# Patient Record
Sex: FEMALE | Race: WHITE | NOT HISPANIC OR LATINO | Employment: FULL TIME | URBAN - METROPOLITAN AREA
[De-identification: names, ages, dates, MRNs, and addresses within clinical notes are randomized per-mention and may not be internally consistent; named-entity substitution may affect disease eponyms.]

---

## 2020-10-13 ENCOUNTER — APPOINTMENT (OUTPATIENT)
Dept: CT IMAGING | Facility: HOSPITAL | Age: 45
End: 2020-10-13

## 2020-10-13 ENCOUNTER — HOSPITAL ENCOUNTER (EMERGENCY)
Facility: HOSPITAL | Age: 45
Discharge: HOME | End: 2020-10-14
Attending: EMERGENCY MEDICINE | Admitting: EMERGENCY MEDICINE

## 2020-10-13 DIAGNOSIS — K52.9 COLITIS: Primary | ICD-10-CM

## 2020-10-13 LAB
ALBUMIN SERPL-MCNC: 4.9 G/DL (ref 3.5–5.2)
ALBUMIN/GLOB SERPL: 1.6 G/DL
ALP SERPL-CCNC: 95 U/L (ref 39–117)
ALT SERPL W P-5'-P-CCNC: 63 U/L (ref 1–33)
ANION GAP SERPL CALCULATED.3IONS-SCNC: 12 MMOL/L (ref 5–15)
AST SERPL-CCNC: 32 U/L (ref 1–32)
BACTERIA UR QL AUTO: ABNORMAL /HPF
BASOPHILS # BLD AUTO: 0.05 10*3/MM3 (ref 0–0.2)
BASOPHILS NFR BLD AUTO: 0.5 % (ref 0–1.5)
BILIRUB SERPL-MCNC: 0.4 MG/DL (ref 0–1.2)
BILIRUB UR QL STRIP: NEGATIVE
BUN SERPL-MCNC: 12 MG/DL (ref 6–20)
BUN/CREAT SERPL: 15.2 (ref 7–25)
CALCIUM SPEC-SCNC: 10 MG/DL (ref 8.6–10.5)
CHLORIDE SERPL-SCNC: 101 MMOL/L (ref 98–107)
CLARITY UR: ABNORMAL
CO2 SERPL-SCNC: 24 MMOL/L (ref 22–29)
COLOR UR: YELLOW
CREAT SERPL-MCNC: 0.79 MG/DL (ref 0.57–1)
DEPRECATED RDW RBC AUTO: 41.1 FL (ref 37–54)
EOSINOPHIL # BLD AUTO: 0.08 10*3/MM3 (ref 0–0.4)
EOSINOPHIL NFR BLD AUTO: 0.7 % (ref 0.3–6.2)
ERYTHROCYTE [DISTWIDTH] IN BLOOD BY AUTOMATED COUNT: 12.4 % (ref 12.3–15.4)
GFR SERPL CREATININE-BSD FRML MDRD: 78 ML/MIN/1.73
GLOBULIN UR ELPH-MCNC: 3.1 GM/DL
GLUCOSE SERPL-MCNC: 88 MG/DL (ref 65–99)
GLUCOSE UR STRIP-MCNC: NEGATIVE MG/DL
HCT VFR BLD AUTO: 43 % (ref 34–46.6)
HGB BLD-MCNC: 14.3 G/DL (ref 12–15.9)
HGB UR QL STRIP.AUTO: ABNORMAL
HOLD SPECIMEN: NORMAL
HOLD SPECIMEN: NORMAL
HYALINE CASTS UR QL AUTO: ABNORMAL /LPF
IMM GRANULOCYTES # BLD AUTO: 0.03 10*3/MM3 (ref 0–0.05)
IMM GRANULOCYTES NFR BLD AUTO: 0.3 % (ref 0–0.5)
KETONES UR QL STRIP: NEGATIVE
LEUKOCYTE ESTERASE UR QL STRIP.AUTO: ABNORMAL
LIPASE SERPL-CCNC: 55 U/L (ref 13–60)
LYMPHOCYTES # BLD AUTO: 2.4 10*3/MM3 (ref 0.7–3.1)
LYMPHOCYTES NFR BLD AUTO: 21.9 % (ref 19.6–45.3)
MCH RBC QN AUTO: 30.4 PG (ref 26.6–33)
MCHC RBC AUTO-ENTMCNC: 33.3 G/DL (ref 31.5–35.7)
MCV RBC AUTO: 91.3 FL (ref 79–97)
MONOCYTES # BLD AUTO: 0.62 10*3/MM3 (ref 0.1–0.9)
MONOCYTES NFR BLD AUTO: 5.7 % (ref 5–12)
MUCOUS THREADS URNS QL MICRO: ABNORMAL /HPF
NEUTROPHILS NFR BLD AUTO: 7.79 10*3/MM3 (ref 1.7–7)
NEUTROPHILS NFR BLD AUTO: 70.9 % (ref 42.7–76)
NITRITE UR QL STRIP: NEGATIVE
NRBC BLD AUTO-RTO: 0 /100 WBC (ref 0–0.2)
PH UR STRIP.AUTO: <=5 [PH] (ref 5–8)
PLATELET # BLD AUTO: 327 10*3/MM3 (ref 140–450)
PMV BLD AUTO: 9.5 FL (ref 6–12)
POTASSIUM SERPL-SCNC: 4 MMOL/L (ref 3.5–5.2)
PROT SERPL-MCNC: 8 G/DL (ref 6–8.5)
PROT UR QL STRIP: ABNORMAL
RBC # BLD AUTO: 4.71 10*6/MM3 (ref 3.77–5.28)
RBC # UR: ABNORMAL /HPF
REF LAB TEST METHOD: ABNORMAL
SODIUM SERPL-SCNC: 137 MMOL/L (ref 136–145)
SP GR UR STRIP: 1.02 (ref 1–1.03)
SQUAMOUS #/AREA URNS HPF: ABNORMAL /HPF
UROBILINOGEN UR QL STRIP: ABNORMAL
WBC # BLD AUTO: 10.97 10*3/MM3 (ref 3.4–10.8)
WBC UR QL AUTO: ABNORMAL /HPF
WHOLE BLOOD HOLD SPECIMEN: NORMAL
WHOLE BLOOD HOLD SPECIMEN: NORMAL
YEAST URNS QL MICRO: ABNORMAL /HPF

## 2020-10-13 PROCEDURE — 99284 EMERGENCY DEPT VISIT MOD MDM: CPT

## 2020-10-13 PROCEDURE — 81001 URINALYSIS AUTO W/SCOPE: CPT | Performed by: NURSE PRACTITIONER

## 2020-10-13 PROCEDURE — 25010000002 ONDANSETRON PER 1 MG: Performed by: NURSE PRACTITIONER

## 2020-10-13 PROCEDURE — 96375 TX/PRO/DX INJ NEW DRUG ADDON: CPT

## 2020-10-13 PROCEDURE — 74177 CT ABD & PELVIS W/CONTRAST: CPT

## 2020-10-13 PROCEDURE — 25010000002 IOPAMIDOL 61 % SOLUTION: Performed by: EMERGENCY MEDICINE

## 2020-10-13 PROCEDURE — 96374 THER/PROPH/DIAG INJ IV PUSH: CPT

## 2020-10-13 PROCEDURE — 96376 TX/PRO/DX INJ SAME DRUG ADON: CPT

## 2020-10-13 PROCEDURE — 85025 COMPLETE CBC W/AUTO DIFF WBC: CPT | Performed by: NURSE PRACTITIONER

## 2020-10-13 PROCEDURE — 83690 ASSAY OF LIPASE: CPT | Performed by: NURSE PRACTITIONER

## 2020-10-13 PROCEDURE — 25010000002 ONDANSETRON PER 1 MG: Performed by: EMERGENCY MEDICINE

## 2020-10-13 PROCEDURE — 25010000002 MORPHINE PER 10 MG: Performed by: EMERGENCY MEDICINE

## 2020-10-13 PROCEDURE — 80053 COMPREHEN METABOLIC PANEL: CPT | Performed by: NURSE PRACTITIONER

## 2020-10-13 RX ORDER — ONDANSETRON 4 MG/1
4 TABLET, ORALLY DISINTEGRATING ORAL 4 TIMES DAILY PRN
Qty: 16 TABLET | Refills: 0 | Status: SHIPPED | OUTPATIENT
Start: 2020-10-13

## 2020-10-13 RX ORDER — CIPROFLOXACIN 500 MG/1
500 TABLET, FILM COATED ORAL EVERY 12 HOURS
Qty: 20 TABLET | Refills: 0 | Status: SHIPPED | OUTPATIENT
Start: 2020-10-13 | End: 2022-07-28

## 2020-10-13 RX ORDER — LEVOFLOXACIN 750 MG/1
750 TABLET ORAL ONCE
Status: COMPLETED | OUTPATIENT
Start: 2020-10-13 | End: 2020-10-14

## 2020-10-13 RX ORDER — ONDANSETRON 2 MG/ML
4 INJECTION INTRAMUSCULAR; INTRAVENOUS ONCE
Status: COMPLETED | OUTPATIENT
Start: 2020-10-13 | End: 2020-10-13

## 2020-10-13 RX ORDER — MORPHINE SULFATE 4 MG/ML
4 INJECTION, SOLUTION INTRAMUSCULAR; INTRAVENOUS ONCE
Status: COMPLETED | OUTPATIENT
Start: 2020-10-13 | End: 2020-10-13

## 2020-10-13 RX ORDER — SODIUM CHLORIDE 0.9 % (FLUSH) 0.9 %
10 SYRINGE (ML) INJECTION AS NEEDED
Status: DISCONTINUED | OUTPATIENT
Start: 2020-10-13 | End: 2020-10-13

## 2020-10-13 RX ORDER — SODIUM CHLORIDE 0.9 % (FLUSH) 0.9 %
10 SYRINGE (ML) INJECTION AS NEEDED
Status: DISCONTINUED | OUTPATIENT
Start: 2020-10-13 | End: 2020-10-14 | Stop reason: HOSPADM

## 2020-10-13 RX ORDER — METRONIDAZOLE 500 MG/1
500 TABLET ORAL ONCE
Status: COMPLETED | OUTPATIENT
Start: 2020-10-13 | End: 2020-10-14

## 2020-10-13 RX ORDER — HYDROCODONE BITARTRATE AND ACETAMINOPHEN 5; 325 MG/1; MG/1
1 TABLET ORAL EVERY 6 HOURS PRN
Qty: 12 TABLET | Refills: 0 | Status: SHIPPED | OUTPATIENT
Start: 2020-10-13

## 2020-10-13 RX ORDER — METRONIDAZOLE 500 MG/1
500 TABLET ORAL 3 TIMES DAILY
Qty: 30 TABLET | Refills: 0 | Status: SHIPPED | OUTPATIENT
Start: 2020-10-13 | End: 2022-07-28

## 2020-10-13 RX ADMIN — SODIUM CHLORIDE 1000 ML: 9 INJECTION, SOLUTION INTRAVENOUS at 21:58

## 2020-10-13 RX ADMIN — IOPAMIDOL 95 ML: 612 INJECTION, SOLUTION INTRAVENOUS at 23:07

## 2020-10-13 RX ADMIN — ONDANSETRON 4 MG: 2 INJECTION INTRAMUSCULAR; INTRAVENOUS at 21:58

## 2020-10-13 RX ADMIN — ONDANSETRON 4 MG: 2 INJECTION INTRAMUSCULAR; INTRAVENOUS at 23:35

## 2020-10-13 RX ADMIN — MORPHINE SULFATE 4 MG: 4 INJECTION, SOLUTION INTRAMUSCULAR; INTRAVENOUS at 21:58

## 2020-10-13 RX ADMIN — MORPHINE SULFATE 4 MG: 4 INJECTION, SOLUTION INTRAMUSCULAR; INTRAVENOUS at 23:35

## 2020-10-13 RX ADMIN — SODIUM CHLORIDE, PRESERVATIVE FREE 10 ML: 5 INJECTION INTRAVENOUS at 21:40

## 2020-10-14 VITALS
BODY MASS INDEX: 35 KG/M2 | RESPIRATION RATE: 18 BRPM | HEIGHT: 67 IN | WEIGHT: 223 LBS | SYSTOLIC BLOOD PRESSURE: 151 MMHG | DIASTOLIC BLOOD PRESSURE: 93 MMHG | HEART RATE: 97 BPM | OXYGEN SATURATION: 98 % | TEMPERATURE: 98.3 F

## 2020-10-14 PROCEDURE — 25010000002 HYDROMORPHONE PER 4 MG: Performed by: EMERGENCY MEDICINE

## 2020-10-14 PROCEDURE — 96375 TX/PRO/DX INJ NEW DRUG ADDON: CPT

## 2020-10-14 RX ORDER — HYDROMORPHONE HYDROCHLORIDE 1 MG/ML
0.5 INJECTION, SOLUTION INTRAMUSCULAR; INTRAVENOUS; SUBCUTANEOUS ONCE
Status: COMPLETED | OUTPATIENT
Start: 2020-10-14 | End: 2020-10-14

## 2020-10-14 RX ADMIN — METRONIDAZOLE 500 MG: 500 TABLET ORAL at 00:06

## 2020-10-14 RX ADMIN — HYDROMORPHONE HYDROCHLORIDE 0.5 MG: 1 INJECTION, SOLUTION INTRAMUSCULAR; INTRAVENOUS; SUBCUTANEOUS at 00:06

## 2020-10-14 RX ADMIN — LEVOFLOXACIN 750 MG: 750 TABLET, FILM COATED ORAL at 00:06

## 2020-10-14 NOTE — ED PROVIDER NOTES
Subjective   Ms. Katerin Bell is a 45 y.o female presenting to the emergency department with complaints of abdominal pain. Her right lower quadrant pain began approximately 2 weeks ago and she complains of nausea and diaphoresis. She received a hysterectomy at the [facility] 1.5 weeks ago and she has a history of an appendectomy. She denies vomiting, urinary frequency, dysuria, urinary urgency, chest pain, shortness of breath, and fever. There are no other acute symptoms at this time.      History provided by:  Patient  Abdominal Pain  Pain location:  RLQ  Pain radiates to:  Does not radiate  Pain severity:  Moderate  Onset quality:  Sudden  Duration:  2 weeks  Timing:  Constant  Progression:  Worsening  Chronicity:  New  Relieved by:  None tried  Worsened by:  Nothing  Ineffective treatments:  None tried  Associated symptoms: nausea    Associated symptoms: no chest pain, no chills, no dysuria, no fever, no hematemesis, no hematochezia, no hematuria, no shortness of breath and no vomiting    Risk factors: multiple surgeries        Review of Systems   Constitutional: Positive for diaphoresis. Negative for chills and fever.   Respiratory: Negative for shortness of breath.    Cardiovascular: Negative for chest pain.   Gastrointestinal: Positive for abdominal pain and nausea. Negative for hematemesis, hematochezia and vomiting.   Genitourinary: Negative for decreased urine volume, difficulty urinating, dyspareunia, dysuria, enuresis, frequency, hematuria and urgency.   All other systems reviewed and are negative.      No past medical history on file.    Allergies   Allergen Reactions   • Motrin [Ibuprofen] Other (See Comments)     GASTRITIS    • Oxycodone Other (See Comments)     NIGHT TERRORS        No past surgical history on file.    No family history on file.    Social History     Socioeconomic History   • Marital status:      Spouse name: Not on file   • Number of children: Not on file   • Years of  education: Not on file   • Highest education level: Not on file         Objective   Physical Exam  Vitals signs and nursing note reviewed.   Constitutional:       Appearance: Normal appearance. She is well-developed. She is ill-appearing. She is not toxic-appearing.      Comments: Pt appears uncomfortable.     HENT:      Head: Normocephalic and atraumatic.      Mouth/Throat:      Mouth: Mucous membranes are moist.   Eyes:      General: Lids are normal.      Extraocular Movements: Extraocular movements intact.      Conjunctiva/sclera: Conjunctivae normal.   Neck:      Musculoskeletal: Full passive range of motion without pain and normal range of motion.      Trachea: Trachea normal.   Cardiovascular:      Rate and Rhythm: Normal rate and regular rhythm.      Pulses: Normal pulses.      Heart sounds: Normal heart sounds.   Pulmonary:      Effort: Pulmonary effort is normal. No respiratory distress.      Breath sounds: Normal breath sounds. No decreased breath sounds, wheezing, rhonchi or rales.   Abdominal:      General: Bowel sounds are normal.      Palpations: Abdomen is soft.      Tenderness: There is abdominal tenderness in the right lower quadrant.   Musculoskeletal: Normal range of motion.   Skin:     General: Skin is warm and dry.      Findings: No rash.   Neurological:      Mental Status: She is alert and oriented to person, place, and time.      Cranial Nerves: No cranial nerve deficit.   Psychiatric:         Speech: Speech normal.         Behavior: Behavior normal. Behavior is cooperative.         Procedures         ED Course  ED Course as of Oct 14 0020   Tue Oct 13, 2020   2152   COVID-19 RISK SCREEN    Has the patient had close contact without PPE with a lab confirmed COVID-19 (+) person or a person under investigation (PUI) for COVID-19 infection?  -- No     Has the patient had respiratory symptoms, worsened/new cough and/or SOA, unexplained fever, or sudden loss of smell and/or taste in the past 7 days?  --  No    Does the patient have baseline higher exposure risk such as working in healthcare field or currently residing in healthcare facility?  --  No          [HV]   2203 WBC, UA(!): 13-20 [KG]   2203 Leukocytes, UA(!): Trace [KG]   2203 Blood, UA(!): Small (1+) [KG]   2348 Reuben Reviewed. Request Number: [id number]     [HV]   Wed Oct 14, 2020   0014 Results are discussed with patient at this time.  Patient will be discharged home.  Patient to take meds as ordered.  Patient to follow-up with PCP.  Patient to return to the ED as needed.  Patient agrees and verbalized understanding.    [KG]      ED Course User Index  [HV] Rachel Davison  [KG] Delia Conde APRN     Recent Results (from the past 24 hour(s))   Urinalysis With Microscopic If Indicated (No Culture) - Urine, Clean Catch    Collection Time: 10/13/20  9:11 PM    Specimen: Urine, Clean Catch   Result Value Ref Range    Color, UA Yellow Yellow, Straw    Appearance, UA Cloudy (A) Clear    pH, UA <=5.0 5.0 - 8.0    Specific Gravity, UA 1.023 1.001 - 1.030    Glucose, UA Negative Negative    Ketones, UA Negative Negative    Bilirubin, UA Negative Negative    Blood, UA Small (1+) (A) Negative    Protein, UA Trace (A) Negative    Leuk Esterase, UA Trace (A) Negative    Nitrite, UA Negative Negative    Urobilinogen, UA 0.2 E.U./dL 0.2 - 1.0 E.U./dL   Urinalysis, Microscopic Only - Urine, Clean Catch    Collection Time: 10/13/20  9:11 PM    Specimen: Urine, Clean Catch   Result Value Ref Range    RBC, UA 7-12 (A) None Seen, 0-2 /HPF    WBC, UA 13-20 (A) None Seen, 0-2 /HPF    Bacteria, UA None Seen None Seen, Trace /HPF    Squamous Epithelial Cells, UA 21-30 (A) None Seen, 0-2 /HPF    Yeast, UA Small/1+ Budding Yeast None Seen /HPF    Hyaline Casts, UA 7-12 0 - 6 /LPF    Mucus, UA Moderate/2+ (A) None Seen, Trace /HPF    Methodology Manual Light Microscopy    Comprehensive Metabolic Panel    Collection Time: 10/13/20  9:40 PM    Specimen: Blood   Result  Value Ref Range    Glucose 88 65 - 99 mg/dL    BUN 12 6 - 20 mg/dL    Creatinine 0.79 0.57 - 1.00 mg/dL    Sodium 137 136 - 145 mmol/L    Potassium 4.0 3.5 - 5.2 mmol/L    Chloride 101 98 - 107 mmol/L    CO2 24.0 22.0 - 29.0 mmol/L    Calcium 10.0 8.6 - 10.5 mg/dL    Total Protein 8.0 6.0 - 8.5 g/dL    Albumin 4.90 3.50 - 5.20 g/dL    ALT (SGPT) 63 (H) 1 - 33 U/L    AST (SGOT) 32 1 - 32 U/L    Alkaline Phosphatase 95 39 - 117 U/L    Total Bilirubin 0.4 0.0 - 1.2 mg/dL    eGFR Non African Amer 78 >60 mL/min/1.73    Globulin 3.1 gm/dL    A/G Ratio 1.6 g/dL    BUN/Creatinine Ratio 15.2 7.0 - 25.0    Anion Gap 12.0 5.0 - 15.0 mmol/L   Lipase    Collection Time: 10/13/20  9:40 PM    Specimen: Blood   Result Value Ref Range    Lipase 55 13 - 60 U/L   CBC Auto Differential    Collection Time: 10/13/20  9:40 PM    Specimen: Blood   Result Value Ref Range    WBC 10.97 (H) 3.40 - 10.80 10*3/mm3    RBC 4.71 3.77 - 5.28 10*6/mm3    Hemoglobin 14.3 12.0 - 15.9 g/dL    Hematocrit 43.0 34.0 - 46.6 %    MCV 91.3 79.0 - 97.0 fL    MCH 30.4 26.6 - 33.0 pg    MCHC 33.3 31.5 - 35.7 g/dL    RDW 12.4 12.3 - 15.4 %    RDW-SD 41.1 37.0 - 54.0 fl    MPV 9.5 6.0 - 12.0 fL    Platelets 327 140 - 450 10*3/mm3    Neutrophil % 70.9 42.7 - 76.0 %    Lymphocyte % 21.9 19.6 - 45.3 %    Monocyte % 5.7 5.0 - 12.0 %    Eosinophil % 0.7 0.3 - 6.2 %    Basophil % 0.5 0.0 - 1.5 %    Immature Grans % 0.3 0.0 - 0.5 %    Neutrophils, Absolute 7.79 (H) 1.70 - 7.00 10*3/mm3    Lymphocytes, Absolute 2.40 0.70 - 3.10 10*3/mm3    Monocytes, Absolute 0.62 0.10 - 0.90 10*3/mm3    Eosinophils, Absolute 0.08 0.00 - 0.40 10*3/mm3    Basophils, Absolute 0.05 0.00 - 0.20 10*3/mm3    Immature Grans, Absolute 0.03 0.00 - 0.05 10*3/mm3    nRBC 0.0 0.0 - 0.2 /100 WBC   Light Blue Top    Collection Time: 10/13/20  9:40 PM   Result Value Ref Range    Extra Tube hold for add-on    Green Top (Gel)    Collection Time: 10/13/20  9:40 PM   Result Value Ref Range    Extra Tube  Hold for add-ons.    Lavender Top    Collection Time: 10/13/20  9:40 PM   Result Value Ref Range    Extra Tube hold for add-on    Gold Top - SST    Collection Time: 10/13/20  9:40 PM   Result Value Ref Range    Extra Tube Hold for add-ons.      Note: In addition to lab results from this visit, the labs listed above may include labs taken at another facility or during a different encounter within the last 24 hours. Please correlate lab times with ED admission and discharge times for further clarification of the services performed during this visit.    CT Abdomen Pelvis With Contrast   Final Result   1.  Mild wall thickening involving the rectosigmoid colon may represent active colitis. There is no diverticulosis. No complicating feature. The remainder of the colon is normal.   2.  Postop recent hysterectomy with a tiny amount of free pelvic fluid. No hematoma or abscess.   3.  Cholecystectomy. Mild extrahepatic bile duct dilatation.      Signer Name: Paolo Orozco MD    Signed: 10/13/2020 11:31 PM    [email or web address]     [facility]        Vitals:    10/13/20 2202 10/13/20 2258 10/14/20 0006 10/14/20 0007   BP: (!) 143/103  151/93    BP Location:       Patient Position:       Pulse: 97      Resp: 18      Temp:       TempSrc:       SpO2: 99% 99% 97% 98%   Weight:       Height:         Medications   sodium chloride 0.9 % flush 10 mL (has no administration in time range)   sodium chloride 0.9 % flush 10 mL (has no administration in time range)   sodium chloride 0.9 % flush 10 mL (10 mL Intravenous Given by Other 10/13/20 2140)   sodium chloride 0.9 % bolus 1,000 mL (0 mL Intravenous Stopped 10/14/20 0009)   Morphine sulfate (PF) injection 4 mg (4 mg Intravenous Given 10/13/20 2158)   ondansetron (ZOFRAN) injection 4 mg (4 mg Intravenous Given 10/13/20 2158)   iopamidol (ISOVUE-300) 61 % injection 100 mL (95 mL Intravenous Given 10/13/20 2307)   Morphine sulfate (PF) injection  4 mg (4 mg Intravenous Given 10/13/20 2335)   ondansetron (ZOFRAN) injection 4 mg (4 mg Intravenous Given 10/13/20 2335)   levoFLOXacin (LEVAQUIN) tablet 750 mg (750 mg Oral Given 10/14/20 0006)   metroNIDAZOLE (FLAGYL) tablet 500 mg (500 mg Oral Given 10/14/20 0006)   HYDROmorphone (DILAUDID) injection 0.5 mg (0.5 mg Intravenous Given 10/14/20 0006)     ECG/EMG Results (last 24 hours)     ** No results found for the last 24 hours. **        No orders to display                                              MDM    Final diagnoses:   Colitis       Documentation assistance provided by vito Davison.  Information recorded by the vtio was done at my direction and has been verified and validated by me.     Rachel Davison  10/13/20 2151       Delia Conde, APRN  10/14/20 0021

## 2022-06-16 ENCOUNTER — TELEPHONE (OUTPATIENT)
Dept: NEUROSURGERY | Facility: CLINIC | Age: 47
End: 2022-06-16

## 2022-07-21 RX ORDER — HYDROCODONE BITARTRATE AND ACETAMINOPHEN 5; 325 MG/1; MG/1
1 TABLET ORAL
COMMUNITY
Start: 2022-06-15 | End: 2022-07-28

## 2022-07-21 RX ORDER — NARATRIPTAN 2.5 MG/1
2.5 TABLET ORAL
COMMUNITY
Start: 2022-02-24

## 2022-07-28 ENCOUNTER — DOCUMENTATION (OUTPATIENT)
Dept: BARIATRICS/WEIGHT MGMT | Facility: CLINIC | Age: 47
End: 2022-07-28

## 2022-07-28 ENCOUNTER — OFFICE VISIT (OUTPATIENT)
Dept: BEHAVIORAL HEALTH | Facility: CLINIC | Age: 47
End: 2022-07-28

## 2022-07-28 ENCOUNTER — OFFICE VISIT (OUTPATIENT)
Dept: BARIATRICS/WEIGHT MGMT | Facility: CLINIC | Age: 47
End: 2022-07-28

## 2022-07-28 VITALS
RESPIRATION RATE: 16 BRPM | OXYGEN SATURATION: 99 % | DIASTOLIC BLOOD PRESSURE: 78 MMHG | WEIGHT: 238 LBS | HEART RATE: 78 BPM | BODY MASS INDEX: 37.35 KG/M2 | HEIGHT: 67 IN | SYSTOLIC BLOOD PRESSURE: 122 MMHG

## 2022-07-28 DIAGNOSIS — E66.01 MORBID OBESITY: Primary | ICD-10-CM

## 2022-07-28 DIAGNOSIS — F41.0 PANIC DISORDER: ICD-10-CM

## 2022-07-28 DIAGNOSIS — Z71.89 ENCOUNTER FOR PSYCHOLOGICAL ASSESSMENT PRIOR TO BARIATRIC SURGERY: ICD-10-CM

## 2022-07-28 DIAGNOSIS — R10.13 DYSPEPSIA: ICD-10-CM

## 2022-07-28 DIAGNOSIS — R11.0 CHRONIC NAUSEA: ICD-10-CM

## 2022-07-28 DIAGNOSIS — R53.83 FATIGUE, UNSPECIFIED TYPE: ICD-10-CM

## 2022-07-28 DIAGNOSIS — E66.9 OBESITY (BMI 30-39.9): Primary | ICD-10-CM

## 2022-07-28 DIAGNOSIS — R53.81 GENERAL DETERIORATION OF HEALTH: ICD-10-CM

## 2022-07-28 DIAGNOSIS — R73.03 PREDIABETES: ICD-10-CM

## 2022-07-28 PROBLEM — Z98.890 POSTOPERATIVE NAUSEA: Status: ACTIVE | Noted: 2022-07-28

## 2022-07-28 PROBLEM — G47.30 SLEEP APNEA: Status: ACTIVE | Noted: 2022-07-28

## 2022-07-28 PROBLEM — F41.9 ANXIETY: Status: ACTIVE | Noted: 2022-07-28

## 2022-07-28 PROBLEM — N20.0 KIDNEY STONES: Status: ACTIVE | Noted: 2022-07-28

## 2022-07-28 PROBLEM — K58.9 IBS (IRRITABLE BOWEL SYNDROME): Status: ACTIVE | Noted: 2022-07-28

## 2022-07-28 PROBLEM — G47.00 INSOMNIA: Status: ACTIVE | Noted: 2022-07-28

## 2022-07-28 PROBLEM — K75.81 NASH (NONALCOHOLIC STEATOHEPATITIS): Status: ACTIVE | Noted: 2022-07-28

## 2022-07-28 PROBLEM — E78.5 HYPERLIPIDEMIA: Status: ACTIVE | Noted: 2022-07-28

## 2022-07-28 PROBLEM — S06.9XAA TBI (TRAUMATIC BRAIN INJURY) (HCC): Status: ACTIVE | Noted: 2022-07-28

## 2022-07-28 PROBLEM — M25.50 JOINT PAIN: Status: ACTIVE | Noted: 2022-07-28

## 2022-07-28 PROBLEM — M19.90 ARTHRITIS: Status: ACTIVE | Noted: 2022-07-28

## 2022-07-28 PROBLEM — R06.09 DYSPNEA ON EXERTION: Status: ACTIVE | Noted: 2022-07-28

## 2022-07-28 PROBLEM — G43.909 MIGRAINES: Status: ACTIVE | Noted: 2022-07-28

## 2022-07-28 PROBLEM — R12 HEARTBURN: Status: ACTIVE | Noted: 2022-07-28

## 2022-07-28 PROBLEM — E21.3 HYPERPARATHYROIDISM (HCC): Status: ACTIVE | Noted: 2022-07-28

## 2022-07-28 PROCEDURE — 90791 PSYCH DIAGNOSTIC EVALUATION: CPT | Performed by: PSYCHOLOGIST

## 2022-07-28 PROCEDURE — 99204 OFFICE O/P NEW MOD 45 MIN: CPT | Performed by: PHYSICIAN ASSISTANT

## 2022-07-28 RX ORDER — CLONIDINE HYDROCHLORIDE 0.1 MG/1
TABLET ORAL
COMMUNITY
Start: 2022-07-27

## 2022-07-28 RX ORDER — LORAZEPAM 1 MG/1
TABLET ORAL
COMMUNITY

## 2022-07-28 RX ORDER — VENLAFAXINE HYDROCHLORIDE 150 MG/1
CAPSULE, EXTENDED RELEASE ORAL
COMMUNITY

## 2022-07-28 NOTE — PROGRESS NOTES
"Weight Loss Surgery  Presurgical Nutrition Assessment     Katerin Thakkarzfus  07/28/2022  [id number]  [id number]  1975   female    Surgery desired: Sleeve Gastrectomy    Height: 168.9 cm (66.5\")  Weight: 108 kg (238 #)  BMI: 37.84    Past Medical History:   Diagnosis Date   • Allergic rhinitis     frequent ear aches and sinus infections   • Anxiety     w/ panic disorder, on Effexor + Lorazepam   • Arthritis     mostly toes, from ballet dancing   • Chronic nausea     prn Zofran   • Dyspepsia    • Dyspnea on exertion    • Fatigue    • Heartburn     no meds   • Hyperlipidemia    • Hyperparathyroidism (HCC)     s/p partial parathyroidectomy 2020   • Insomnia     on Clonidine   • Joint pain     prn NSAIDS, no steroids   • Kidney stones     s/p lithotripsy 2019   • Migraines    • Morbid obesity (HCC)    • CASTILLO (nonalcoholic steatohepatitis)     dx on CT, denies prior liver bx   • Postoperative nausea    • Prediabetes     A1C 5.7   • TBI (traumatic brain injury) (HCC)     age 12     Past Surgical History:   Procedure Laterality Date   • APPENDECTOMY OPEN  1995   • COLONOSCOPY  2021    hx colitis   • ENDOSCOPY  2021    @   • KIDNEY STONE SURGERY  2019   • LAPAROSCOPIC CHOLECYSTECTOMY  2013    (+) stones   • LAPAROSCOPIC OVARIAN CYSTECTOMY  2018   • LAPAROSCOPIC TOTAL HYSTERECTOMY  2020    @[facility]   • PARATHYROIDECTOMY  2020    3 of 4 glands   • PILONIDAL CYSTECTOMY  2013    x6 (2013, 2014, 2015, 2017, 2018)   • UMBILICAL HERNIA REPAIR  2013    w/ [facility]     Allergies   Allergen Reactions   • Baclofen Nausea And Vomiting and Rash   • Contrast Dye Other (See Comments)     migraines    • Cyclobenzaprine Other (See Comments)     Brand name: Flexeril - night terrors     • Methocarbamol Other (See Comments)     NIGHT TERRORS     • Motrin [Ibuprofen] Other (See Comments)     GASTRITIS    • Oxycodone Other (See Comments)     NIGHT TERRORS        Current Outpatient Medications:   •  cloNIDine (CATAPRES) 0.1 MG " tablet, , Disp: , Rfl:   •  Erenumab-aooe (AIMOVIG) 140 MG/ML prefilled syringe, Inject 140 mg under the skin into the appropriate area as directed Every 30 (Thirty) Days., Disp: , Rfl:   •  HYDROcodone-acetaminophen (NORCO) 5-325 MG per tablet, Take 1 tablet by mouth Every 6 (Six) Hours As Needed for Moderate Pain ., Disp: 12 tablet, Rfl: 0  •  LORazepam (ATIVAN) 1 MG tablet, lorazepam, Disp: , Rfl:   •  naratriptan (AMERGE) 2.5 MG tablet, Take 2.5 mg by mouth., Disp: , Rfl:   •  ondansetron ODT (ZOFRAN-ODT) 4 MG disintegrating tablet, Place 1 tablet on the tongue 4 (Four) Times a Day As Needed for Nausea or Vomiting., Disp: 16 tablet, Rfl: 0  •  venlafaxine XR (EFFEXOR-XR) 150 MG 24 hr capsule, Effexor XR, Disp: , Rfl:       Assessment of Caloric needs    Estimated Current energy needs:  1730 kcal    Estimated calories for weight loss:  1600 kcal    IBW (Pounds):  155 #      Excess body weight (Pounds):  85 #       Nutrition Recall:  Example of Usual 24 hour intake:      Breakfast: 10 oz coffee with sugar free creamer and 5 oz bottled water.  No food.     Lunch: 20 oz bottle of diet Pepsi and 2 cups diced cantaloupe    Dinner: 2 pieces of flatbread pizza withtomatoes, pesto and [place] estrada    Snacks: at midnight ate a snack bag of mini chocolate chip cookes, 2 packages PB filled crackers, 20 oz bottle Root Beer.     Beverages of Choice: daily diet Pepsi (20 oz),  Root Beer (20 oz), 10 oz coffee, as above.  Loves brewed tea with no sugar. Coffee /c creamer each morning.     Food Allergies or Intolerances: none stated          Exercise: none currently       Assessment of Nutritional Adequacy, Excessive Intake or Deficiencies:   Diet is deficient in protein and total calories with insufficient fluids.  Lacks a variety of fruits and vegetables.         Education    Provided information packet re:  Sleeve Gastrectomy  1. Reviewed guidelines for higher protein, limited carbohydrate diet to promote weight loss.   Encouraged patient to incorporate these principles of healthy eating    2. Encouraged patient to choose an acceptable protein supplement beverage for the post-surgery liquid diet.  Provided product guidelines and examples.    3. Explained importance of goal setting to help in changing eating behaviors that are not conducive to weight loss.  Specific macronutrient goals as below.   4. Provided follow-up options for support, including contact information for dietitians here.   Web-based support information and apps for smart phones and computers given.      Recommend that team proceed with surgery and follow per protocol.      Nutrition Goals   Protein goal:  grams per day in three regular balanced meals and two to three high protein snacks each day.  Carbohydrate goal:  100-140 grams per day  Beverage goal: Appropriate non-carbonated beverage intake     Exercise Goals  Patient is a former long-distance runner and states she still sees herself as such.  In the past, after races, states her appetite was depressed and that she continues to go long periods without food.   She will incorporate moderate activity as tolerated, [id number] minutes of activity per day as tolerated and per medical advice as she works toward a greater fitness level.    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       Carloyne Lilly, ROGERS  07/28/2022  10:55 EDT

## 2022-07-28 NOTE — PROGRESS NOTES
"[facility] BARIATRIC SURGERY  [address]  [address]  [facility] [id number] [phone]      Patient  Name:  Katerin Bell  :  1975      Date of Visit: 2022      Chief Complaint:  weight gain; unable to maintain weight loss      History of Present Illness:  Katerin Bell is a 47 y.o. female who presents today for evaluation, education and consultation regarding metabolic and bariatric surgery with Dr. Hutchins.     .  Lives in [place].  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     Says had always been underweight, most of her life - was a runner, ballet dancer, healthy eater.      When she turned 38 she was finally started on a medication regimen (Effexor + Lorazepam) that drastically helped w/ her chronic panic disorder.  She gained 25 lbs and was finally a more normal weight.     But then required 6 pilonidal cyst procedures over the next few years which forced her to stop running.    Shortly thereafter began having abdominal/GYN issues requiring several additional surgeries - lap ken + UHR  (prolonged/difficult recovery), lap ovarian cystectomy , lap total hysterectomy .    Now more recently undergoing eval for possible  shunt w/ ? 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     Following w/ Neurology @ and the Chief of Neurology @ [facility] who encouraged her to pursue bariatric surgery as a possible therapeutic intervention.      She very much wants to get/feel healthy again.  Feels weight gain is r/t medical issues, psychological trauma, and age.    Also notes hx chronic RLQ pain x 3 years, radiating into back/sacrum, unclear etiology (? ilioinguinal neuralgia) - evaluated @ [facility], [facility], and .  " Suspect may be nerve damage, MSK, trauma related.  Manages pain w/ daily Norco.      Has associated nausea, chronic, upon awakening.  Does not eat breakfast.  Cannot tolerate food until late afternoon.  Eats most meals late evening.  Occ vomiting.  Takes Zofran prn.      Had GI evals @[facility], [facility], and 2nd opinion in [place].  Has not had GES.      EGD 2021 @[facility] - dx w/ GERD but has infrequent heartburn, does not require medication.      ----  Katerin has been overweight for at least 7 years, has been 35 pounds or more overweight for at least 6 years, has been 100 pounds or more overweight for 4 or more years.  Previous diet attempts include: Low Carbohydrate, Low Fat, Calorie Counting, Karyna's Diet, [place] and Fasting.  The most weight Katerin lost was 15 pounds w/ Atkins but was unable to maintain that weight loss.  Her maximum lifetime weight is 240 pounds.       Complete history has been obtained and discussed today, as pertinent to metabolic/ bariatric surgery.     Past Medical History:   Diagnosis Date   • Allergic rhinitis     frequent ear aches and sinus infections   • Anxiety     w/ panic disorder, on Effexor + Lorazepam   • Arthritis     mostly toes, from ballet dancing   • Chronic nausea     prn Zofran   • Dyspepsia    • Dyspnea on exertion    • Fatigue    • Heartburn     no meds   • Hyperlipidemia    • Hyperparathyroidism (HCC)     s/p partial parathyroidectomy 2020   • Insomnia     on Clonidine   • Joint pain     prn NSAIDS, no steroids   • Kidney stones     s/p lithotripsy 2019   • Migraines    • Morbid obesity (HCC)    • CASTILLO (nonalcoholic steatohepatitis)     dx on CT, denies prior liver bx   • Postoperative nausea    • Prediabetes     A1C 5.7   • TBI (traumatic brain injury) (HCC)     age 12     Past Surgical History:   Procedure Laterality Date   • APPENDECTOMY OPEN  1995   • COLONOSCOPY  2021    hx colitis   • ENDOSCOPY  2021    @[facility]   • KIDNEY STONE SURGERY  2019   • LAPAROSCOPIC CHOLECYSTECTOMY   2013    (+) stones   • LAPAROSCOPIC OVARIAN CYSTECTOMY  2018   • LAPAROSCOPIC TOTAL HYSTERECTOMY  2020    @[facility]   • PARATHYROIDECTOMY  2020    3 of 4 glands   • PILONIDAL CYSTECTOMY  2013    x6 (2013, 2014, 2015, 2017, 2018)   • UMBILICAL HERNIA REPAIR  2013    w/ [facility] ken [facility]       Allergies   Allergen Reactions   • Baclofen Nausea And Vomiting and Rash   • Contrast Dye Other (See Comments)     migraines    • Cyclobenzaprine Other (See Comments)     Brand name: Flexeril - night terrors     • Methocarbamol Other (See Comments)     NIGHT TERRORS     • Motrin [Ibuprofen] Other (See Comments)     GASTRITIS    • Oxycodone Other (See Comments)     NIGHT TERRORS        Current Outpatient Medications:   •  cloNIDine (CATAPRES) 0.1 MG tablet, , Disp: , Rfl:   •  Erenumab-aooe (AIMOVIG) 140 MG/ML prefilled syringe, Inject 140 mg under the skin into the appropriate area as directed Every 30 (Thirty) Days., Disp: , Rfl:   •  HYDROcodone-acetaminophen (NORCO) 5-325 MG per tablet, Take 1 tablet by mouth Every 6 (Six) Hours As Needed for Moderate Pain ., Disp: 12 tablet, Rfl: 0  •  LORazepam (ATIVAN) 1 MG tablet, lorazepam, Disp: , Rfl:   •  naratriptan (AMERGE) 2.5 MG tablet, Take 2.5 mg by mouth., Disp: , Rfl:   •  ondansetron ODT (ZOFRAN-ODT) 4 MG disintegrating tablet, Place 1 tablet on the tongue 4 (Four) Times a Day As Needed for Nausea or Vomiting., Disp: 16 tablet, Rfl: 0  •  venlafaxine XR (EFFEXOR-XR) 150 MG 24 hr capsule, Effexor XR, Disp: , Rfl:     Social History     Socioeconomic History   • Marital status:    Tobacco Use   • Smoking status: Never Smoker   • Smokeless tobacco: Never Used   Substance and Sexual Activity   • Alcohol use: Never   • Drug use: Never   • Sexual activity: Defer     Social History     Social History Narrative    Lives in [facility].   with no children.  Instructor @ [employer].        Family History   Problem Relation Age of  Onset   • Breast cancer Mother 49   • Obesity Father    • Hypertension Father    • Heart attack Father 59   • Heart disease Father 59   • Sleep apnea Father 65   • Prostate cancer Father 68   • No Known Problems Sister    • No Known Problems Brother    • Stroke Maternal Grandmother 82   • Skin cancer Maternal Grandmother    • Kidney disease Maternal Grandmother    • Mental illness Maternal Grandmother    • Skin cancer Maternal Grandfather    • Alcohol abuse Maternal Grandfather    • Heart attack Paternal Grandmother 80   • Heart disease Paternal Grandmother 75   • Alcohol abuse Paternal Grandfather        Review of Systems:  Constitutional:  reports fatigue, weight gain and denies fevers, chills.  HEENT:  denies headache, ear pain or loss of hearing, double vision, nasal discharge or sore throat.  Cardiovascular:  reports HLD and denies hx heart disease, hx MI, chest pain, palpitations, hx DVT.  Respiratory:  denies cough , wheezing, sleep apnea, asthma, hx PE.  Gastrointestinal:  reports nausea, vomiting, IBS, liver disease and denies dysphagia, heartburn, hx pancreatitis.  Genitourinary:  denies history of  frequent UTI, incontinence, hematuria, dysuria, polyuria, polydipsia, renal insufficiency.    Musculoskeletal:  reports joint pain, arthritis and denies fibromyalgia and autoimmune disease.  Neurological:  reports migraines and denies seizure, stroke.  Psychiatric:  reports hx anxiety w/ panic d/o and denies depressed mood, bipolar disorder.  Endocrine:  reports glucose intolerance and denies diabetes, thyroid disease.  Hematologic:  denies bruising, bleeding disorder, hx anemia, hx blood transfusion.      Physical Exam:  Vital Signs:  Weight: 108 kg (238 lb)   Body mass index is 37.84 kg/m².      Heart Rate: 78   BP: 122/78     Physical Exam  Vitals reviewed.   Constitutional:       Appearance: She is well-developed.      Comments: wearing a mask   HENT:      Head: Normocephalic and atraumatic.   Eyes:       General: No scleral icterus.     Conjunctiva/sclera: Conjunctivae normal.   Neck:      Thyroid: No thyromegaly.   Cardiovascular:      Rate and Rhythm: Normal rate and regular rhythm.      Heart sounds: No murmur heard.  Pulmonary:      Effort: Pulmonary effort is normal. No respiratory distress.      Breath sounds: Normal breath sounds. No wheezing or rales.   Abdominal:      General: Bowel sounds are normal. There is no distension.      Palpations: Abdomen is soft. There is no mass.      Tenderness: There is no abdominal tenderness.      Hernia: No hernia is present.      Comments: scars: lap ken, umbilical, lap hysterectomy, open appy   Musculoskeletal:         General: Normal range of motion.      Cervical back: Neck supple.   Skin:     General: Skin is warm and dry.      Findings: No rash.   Neurological:      Mental Status: She is alert and oriented to person, place, and time.      Gait: Gait normal.   Psychiatric:         Judgment: Judgment normal.         Patient Active Problem List   Diagnosis   • Hyperlipidemia   • Morbid obesity (HCC)   • Fatigue   • Dyspepsia   • Dyspnea on exertion   • TBI (traumatic brain injury) (HCC)   • Postoperative nausea   • Anxiety   • Insomnia   • Migraines   • Chronic nausea   • Joint pain   • Kidney stones   • Prediabetes   • IBS (irritable bowel syndrome)   • CASTILLO (nonalcoholic steatohepatitis)   • Hyperparathyroidism (HCC)   • Arthritis   • Heartburn       Assessment:  47 y.o. female with medically complicated obesity pursuing sleeve gastrectomy.    Metabolic & Bariatric Surgery is deemed medically necessary given the following: Class 2 Severe Obesity (BMI >=35 and <=39.9). Obesity-related health conditions include the following: hydrocephalus, fatty liver, prediabetes, migraines, hyperlipidemia, joint pain, chronic abd.pain, nausea and fatigue. Obesity is worsening. BMI is is above average; BMI management plan is completed. We discussed consulting a Bariatric  surgeon.        Plan:  Further evaluation will include: CBC, CMP, Lipids, TSH, HgA1C, H.Pylori UBT, EKG, CXR, Gastric Emptying Study and EGD (requested for review).    Additional clearances needed prior to surgery will include: Neurology.     Patient understands that bariatric surgery is not cosmetic surgery but rather a tool to help make a lifelong commitment to lifestyle changes including diet, exercise and behavior modifications.  The patient has been educated today on those expected postoperative lifestyle changes.  Psychological and Nutritional consultations will be completed prior to surgery.  Instructions on how to access [employer] (an internet based site w/ educational surgical videos) were given to the patient.  Recommended perioperative vitamin supplementation was reviewed.  The importance of avoiding ASA/ NSAIDS/ steroids/ tobacco/nicotine/ hormones/ immunomodulators perioperatively was discussed in detail.  All questions/concerns have been addressed.      Further input to follow pending the above.           MARI Alonso

## 2022-07-28 NOTE — PROGRESS NOTES
PROGRESS NOTE    Data:    Katerin Bell is a 47 y.o. female who met with the undersigned for a scheduled psychological evaluation from 9:00 - 9:45am.      Clinical Maneuvering/Intervention:      Chief complaint and history of presenting illness/Problems: struggling with obesity for several years. Despite trying different weight loss plans and diets, the pt reported being unsuccessful in losing weight. A psychological evaluation was conducted in order to assess past and current level of functioning. Areas assessed included, but were not limited to: perception of social support, perception of ability to face and deal with challenges in life (positive functioning), anxiety symptoms, depressive symptoms, perspective on beliefs/belief system, coping skills for stress, intelligence level, addiction issues, etc. Therapeutic rapport was established. Interventions conducted today were geared towards assessing the pt's readiness for weight loss surgery and identifying and psychological contraindications for undergoing such a major life change. 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 Current psychological struggles were described as low but included panic disorder (symptoms currently well-managed with medication). She has suffered numerous health problems over the past 9 years (prediabetic, migraines, hydrocephalus, etc.). She worries that health with continue to rapidly decline and/or not improve without help with losing weight. Coping skills for distress and related to undergoing a major life change such as weight loss surgery/weight loss were deemed strong and included strong katheryn in God, intelligent, relies on her strong social support system, follows directions well, responsible person, maintains quality relationships with others, and believes in herself that  she will be successful with weight loss surgery. The pt endorsed having characteristics of readiness to undergo major life changes inherent in the journey of weight loss surgery. She could speak to having suffered enough with failed diets and that she feels quite confident that weight loss surgery is right for her. She was able to describe as well, why it made sense to her that having this surgery at this point in life makes sense. Life is feeling centered, stable, etc. The pt expressed gratitude for today's visit.     Past Family and Social History:      History of family mental health problems: none endorsed    Psychosocial history: treatment of psychiatric care in the past (N/A), alcohol/substance abuse treatment in the past (N/A) , alcohol/substance abuse problems (N/A), inpatient psychiatric care (N/A).    Mental Status Exam (MSE):  Hygiene:  good  Dress: normal  Attitude:  cooperative and proactive  Motor Activity: normal  Speech: normal  Mood:   hopeful  Affect:  congruent  Thought Processes: normal  Thought Content:  normal  Suicidal Thoughts:  not endorsed  Homicidal Thoughts:  not endorsed  Crisis Safety Plan: not needed   Hallucinations:  none      Patient's Support Network Includes:  family, friends      Progress toward goal: there is evidence to suggest that she is taking measures to improve the quality of her life including seeking weight loss surgery.       Functional Status: moderate to high      Prognosis: good with weight loss surgery    Evaluation, Diagnoses, and Ability/Capacity to Respond to Treatment:      The pt presented to be struggling with panic disorder and obesity (BMI = 37.84, obesity). Results of MSE demonstrated a functional status of moderate to high. Strengths: belief in self that she will be successful with weight loss surgery, etc (see detailed list of coping skills above). Needed for growth (CPT code requirement for Weaknesses): weight loss.      From a psychological standpoint,  the pt presents as a good candidate for bariatric surgery. She is motivated for the surgery, has showed readiness for the lifestyle change in terms of starting to adjust her eating habits, and seems to have appropriate expectations of how to prepare and how to live after surgery in order to lose weight successfully.    Treatment Plan:      Short term goals: Start improving her health by following up with her bariatric surgeon in order to receive weight loss surgery as soon as feasible/appropriate and demonstrate success with compliance to adhering to the recommended diet. Long term goals: reach a healthy weight and prevention of relapse of panic disorder via taking control over her health.    Deborah Unger, PhD, LP

## 2022-08-01 LAB
TSH SERPL DL<=0.005 MIU/L-ACNC: 6.24 UIU/ML (ref 0.45–4.5)
UREA BREATH TEST QL: NEGATIVE

## 2024-08-23 ENCOUNTER — APPOINTMENT (OUTPATIENT)
Facility: HOSPITAL | Age: 49
End: 2024-08-23
Payer: COMMERCIAL

## 2024-08-23 ENCOUNTER — HOSPITAL ENCOUNTER (EMERGENCY)
Facility: HOSPITAL | Age: 49
Discharge: HOME | End: 2024-08-23
Attending: EMERGENCY MEDICINE
Payer: COMMERCIAL

## 2024-08-23 VITALS
RESPIRATION RATE: 18 BRPM | HEIGHT: 67 IN | BODY MASS INDEX: 39.08 KG/M2 | SYSTOLIC BLOOD PRESSURE: 123 MMHG | HEART RATE: 74 BPM | WEIGHT: 249 LBS | DIASTOLIC BLOOD PRESSURE: 77 MMHG | OXYGEN SATURATION: 96 % | TEMPERATURE: 98 F

## 2024-08-23 DIAGNOSIS — H66.92 ACUTE OTITIS MEDIA, LEFT: Primary | ICD-10-CM

## 2024-08-23 PROCEDURE — 99284 EMERGENCY DEPT VISIT MOD MDM: CPT

## 2024-08-23 PROCEDURE — 70450 CT HEAD/BRAIN W/O DYE: CPT

## 2024-08-23 RX ORDER — HYDROCODONE BITARTRATE AND ACETAMINOPHEN 5; 325 MG/1; MG/1
1 TABLET ORAL ONCE
Status: COMPLETED | OUTPATIENT
Start: 2024-08-23 | End: 2024-08-23

## 2024-08-23 RX ORDER — CEFDINIR 300 MG/1
300 CAPSULE ORAL 2 TIMES DAILY
Qty: 20 CAPSULE | Refills: 0 | Status: SHIPPED | OUTPATIENT
Start: 2024-08-23

## 2024-08-23 RX ORDER — FLUTICASONE PROPIONATE 50 MCG
2 SPRAY, SUSPENSION (ML) NASAL DAILY
Qty: 11.1 ML | Refills: 0 | Status: SHIPPED | OUTPATIENT
Start: 2024-08-23

## 2024-08-23 RX ORDER — CEFDINIR 300 MG/1
300 CAPSULE ORAL ONCE
Status: COMPLETED | OUTPATIENT
Start: 2024-08-23 | End: 2024-08-23

## 2024-08-23 RX ORDER — BROMPHENIRAMINE MALEATE, PSEUDOEPHEDRINE HYDROCHLORIDE, AND DEXTROMETHORPHAN HYDROBROMIDE 2; 30; 10 MG/5ML; MG/5ML; MG/5ML
5 SYRUP ORAL 4 TIMES DAILY PRN
Qty: 60 ML | Refills: 0 | Status: SHIPPED | OUTPATIENT
Start: 2024-08-23

## 2024-08-23 RX ADMIN — CEFDINIR 300 MG: 300 CAPSULE ORAL at 15:55

## 2024-08-23 RX ADMIN — HYDROCODONE BITARTRATE AND ACETAMINOPHEN 1 TABLET: 5; 325 TABLET ORAL at 15:55

## 2024-08-24 NOTE — FSED PROVIDER NOTE
"Subjective  History of Present Illness:    Patient is a 49-year-old female presenting to the emergency department with complaints of cough.  She states symptoms have been ongoing for approximately 3 weeks.  She also complains of ear pain and congestion.  She has reportedly been seen multiple times for symptoms and has completed 5 days of Augmentin, as well as a steroid taper with ongoing symptoms.  She states she had developed \"wooshing\" in her head and tinnitus.  She does report some hearing loss.  She has a history of hydrocephalus and has a  shunt.  This is followed by  neurosurgery.      Nurses Notes reviewed and agree, including vitals, allergies, social history and prior medical history.     REVIEW OF SYSTEMS: All systems reviewed and not pertinent unless noted.  Review of Systems   HENT:  Positive for ear pain.    Respiratory:  Positive for cough.    All other systems reviewed and are negative.      Past Medical History:   Diagnosis Date    Allergic rhinitis     frequent ear aches and sinus infections    Anxiety     w/ panic disorder, on Effexor + Lorazepam    Arthritis     mostly toes, from ballet dancing    Chronic nausea     prn Zofran    Dyspepsia     Dyspnea on exertion     Fatigue     Heartburn     no meds    Hyperlipidemia     Hyperparathyroidism     s/p partial parathyroidectomy 2020    Insomnia     on Clonidine    Joint pain     prn NSAIDS, no steroids    Kidney stones     s/p lithotripsy 2019    Migraines     Morbid obesity     CASTILLO (nonalcoholic steatohepatitis)     dx on CT, denies prior liver bx    Postoperative nausea     Prediabetes     A1C 5.7    TBI (traumatic brain injury)     age 12       Allergies:    Baclofen, Contrast dye (echo or unknown ct/mr), Cyclobenzaprine, Methocarbamol, Motrin [ibuprofen], and Oxycodone      Past Surgical History:   Procedure Laterality Date    APPENDECTOMY OPEN  1995    COLONOSCOPY  2021    hx colitis    ENDOSCOPY  2021    @    FOOT SURGERY Right     " "KIDNEY STONE SURGERY  2019    LAPAROSCOPIC CHOLECYSTECTOMY  2013    (+) stones    LAPAROSCOPIC OVARIAN CYSTECTOMY  2018    LAPAROSCOPIC TOTAL HYSTERECTOMY  2020    @[facility]    PARATHYROIDECTOMY  2020    3 of 4 glands    PILONIDAL CYSTECTOMY  2013    x6 (2013, 2014, 2015, 2017, 2018)    UMBILICAL HERNIA REPAIR  2013    w/ lap ken @[facility]     SHUNT INSERTION Right 2022         Social History     Socioeconomic History    Marital status:    Tobacco Use    Smoking status: Never    Smokeless tobacco: Never   Substance and Sexual Activity    Alcohol use: Never    Drug use: Never    Sexual activity: Defer         Family History   Problem Relation Age of Onset    Breast cancer Mother 49    Obesity Father     Hypertension Father     Heart attack Father 59    Heart disease Father 59    Sleep apnea Father 65    Prostate cancer Father 68    No Known Problems Sister     No Known Problems Brother     Stroke Maternal Grandmother 82    Skin cancer Maternal Grandmother     Kidney disease Maternal Grandmother     Mental illness Maternal Grandmother     Skin cancer Maternal Grandfather     Alcohol abuse Maternal Grandfather     Heart attack Paternal Grandmother 80    Heart disease Paternal Grandmother 75    Alcohol abuse Paternal Grandfather        Objective  Physical Exam:  /77   Pulse 74   Temp 98 °F (36.7 °C) (Oral)   Resp 18   Ht 170.2 cm (67\")   Wt 113 kg (249 lb)   SpO2 96%   BMI 39.00 kg/m²      Physical Exam  Vitals and nursing note reviewed.   Constitutional:       Appearance: Normal appearance. She is normal weight.   HENT:      Head: Normocephalic and atraumatic.      Right Ear: Tympanic membrane is injected.      Left Ear: Tympanic membrane is injected and bulging.   Eyes:      Extraocular Movements: Extraocular movements intact.      Pupils: Pupils are equal, round, and reactive to light.   Cardiovascular:      Rate and Rhythm: Normal rate.   Pulmonary:      Effort: Pulmonary effort is " normal.      Breath sounds: Normal breath sounds.   Musculoskeletal:         General: Normal range of motion.      Cervical back: Normal range of motion.   Skin:     General: Skin is warm and dry.   Neurological:      General: No focal deficit present.      Mental Status: She is alert and oriented to person, place, and time. Mental status is at baseline.   Psychiatric:         Mood and Affect: Mood normal.         Behavior: Behavior normal.         Thought Content: Thought content normal.         Judgment: Judgment normal.         Procedures    ED Course:     Patient was evaluated and was found to have bilateral otitis media.  I did obtain a head CT given her history of hydrocephalus.  Mild ventricular enlargement was noted, though no suspicious findings for acute hydrocephalus.  She denies ongoing headache and has a steady gait. I Did advise follow-up with neurosurgery.  She would be started on Omnicef for otitis media and provided antitussives for ongoing cough.  She was also advised to follow-up with ENT.  She understands and agrees with plan of care.  She is well-appearing with stable vitals, discharge    Lab Results (last 24 hours)       ** No results found for the last 24 hours. **             CT Head Without Contrast    Result Date: 8/23/2024  CT HEAD WO CONTRAST Date of Exam: 8/23/2024 4:27 PM EDT Indication: dizziness. Comparison: None available. Technique: Axial CT images were obtained of the head without contrast administration.  Automated exposure control and iterative construction methods were used. Findings: No large territory infarct. There is no evidence of hemorrhage. No mass effect, edema or midline shift Unremarkable white matter No extra-axial fluid collection. Right frontal approach  shunt with the distal tip in the body of the left lateral ventricle. There is mild enlargement of the ventricular system, nonspecific. The visualized orbits are unremarkable. The visualized paranasal sinuses and  mastoid air cells are clear. The visualized soft tissues are unremarkable. No acute osseous abnormality.     Impression: Impression: No definite acute intracranial abnormality. Right frontal approach  shunt with the distal tip in the left lateral ventricle. There is mild enlargement of the ventricular system. No prior studies available for comparison of the ventricular system. There is no definite evidence of transependymal flow of CSF to suggest acute hydrocephalus. Electronically Signed: Guru Mariscal DO  8/23/2024 4:44 PM EDT  Workstation ID: [id number]        [facility]     Amount and/or Complexity of Data Reviewed  Tests in the radiology section of CPT®: reviewed          DDX: includes but is not limited to: Otitis media, respiratory infection, hydrocephalus    Patient arrives POV with vitals interpreted by myself.     Pertinent features from physical exam: Bulging and erythema noted of the left TM.  Erythema noted of right TM.        Medications   cefdinir (OMNICEF) capsule 300 mg (300 mg Oral Given 8/23/24 1555)   HYDROcodone-acetaminophen (NORCO) 5-325 MG per tablet 1 tablet (1 tablet Oral Given 8/23/24 1555)       Results/clinical rationale were discussed with patient    Consultations/Discussion of results with other physicians: Dr. Thomas    -----  ED Disposition       ED Disposition   Discharge    Condition   Stable    Comment   --             Final diagnoses:   Acute otitis media, left      Your Follow-Up Providers       Keisha Dallas MD.    Specialty: Internal Medicine  Follow up details: recheck of symptoms  [address] [address] [phone]                       Contact information for after-discharge care    Follow-up information has not been specified.                    Your medication list        START taking these medications        Instructions Last Dose Given Next Dose Due   brompheniramine-pseudoephedrine-DM 30-2-10 MG/5ML syrup      Take 5 mL by mouth 4 (Four) Times a Day As  Needed for Allergies.       cefdinir 300 MG capsule  Commonly known as: OMNICEF      Take 1 capsule by mouth 2 (Two) Times a Day.       fluticasone 50 MCG/ACT nasal spray  Commonly known as: FLONASE      2 sprays into the nostril(s) as directed by provider Daily.              CONTINUE taking these medications        Instructions Last Dose Given Next Dose Due   cloNIDine 0.1 MG tablet  Commonly known as: CATAPRES           Erenumab-aooe 140 MG/ML auto-injector  Commonly known as: AIMOVIG      Inject 140 mg under the skin into the appropriate area as directed Every 30 (Thirty) Days.       HYDROcodone-acetaminophen 5-325 MG per tablet  Commonly known as: NORCO      Take 1 tablet by mouth Every 6 (Six) Hours As Needed for Moderate Pain .       LORazepam 1 MG tablet  Commonly known as: ATIVAN      lorazepam       naratriptan 2.5 MG tablet  Commonly known as: AMERGE      Take 2.5 mg by mouth.       ondansetron ODT 4 MG disintegrating tablet  Commonly known as: ZOFRAN-ODT      Place 1 tablet on the tongue 4 (Four) Times a Day As Needed for Nausea or Vomiting.       venlafaxine  MG 24 hr capsule  Commonly known as: EFFEXOR-XR      Effexor XR                 Where to Get Your Medications        These medications were sent to [id number] - [place], [place] - [address] AT [facility] - [phone]  - [phone]   [address], [facility] [address]      Phone: [phone]   brompheniramine-pseudoephedrine-DM 30-2-10 MG/5ML syrup  cefdinir 300 MG capsule  fluticasone 50 MCG/ACT nasal spray

## 2024-11-21 ENCOUNTER — APPOINTMENT (OUTPATIENT)
Facility: HOSPITAL | Age: 49
End: 2024-11-21
Payer: COMMERCIAL

## 2024-11-21 ENCOUNTER — HOSPITAL ENCOUNTER (EMERGENCY)
Facility: HOSPITAL | Age: 49
Discharge: HOME | End: 2024-11-21
Attending: EMERGENCY MEDICINE
Payer: COMMERCIAL

## 2024-11-21 VITALS
RESPIRATION RATE: 18 BRPM | DIASTOLIC BLOOD PRESSURE: 80 MMHG | WEIGHT: 222.66 LBS | BODY MASS INDEX: 34.95 KG/M2 | HEART RATE: 62 BPM | TEMPERATURE: 98.5 F | HEIGHT: 67 IN | OXYGEN SATURATION: 98 % | SYSTOLIC BLOOD PRESSURE: 123 MMHG

## 2024-11-21 DIAGNOSIS — K59.00 CONSTIPATION, UNSPECIFIED CONSTIPATION TYPE: ICD-10-CM

## 2024-11-21 DIAGNOSIS — N30.90 CYSTITIS: Primary | ICD-10-CM

## 2024-11-21 DIAGNOSIS — R93.2 ABNORMAL CT OF LIVER: ICD-10-CM

## 2024-11-21 LAB
ALBUMIN SERPL-MCNC: 4.5 G/DL (ref 3.5–5.2)
ALBUMIN/GLOB SERPL: 1.6 G/DL
ALP SERPL-CCNC: 84 U/L (ref 39–117)
ALT SERPL W P-5'-P-CCNC: 33 U/L (ref 1–33)
ANION GAP SERPL CALCULATED.3IONS-SCNC: 12.6 MMOL/L (ref 5–15)
AST SERPL-CCNC: 21 U/L (ref 1–32)
BACTERIA UR QL AUTO: ABNORMAL /HPF
BASOPHILS # BLD AUTO: 0.04 10*3/MM3 (ref 0–0.2)
BASOPHILS NFR BLD AUTO: 0.5 % (ref 0–1.5)
BILIRUB SERPL-MCNC: 0.2 MG/DL (ref 0–1.2)
BILIRUB UR QL STRIP: NEGATIVE
BUN SERPL-MCNC: 15 MG/DL (ref 6–20)
BUN/CREAT SERPL: 16.9 (ref 7–25)
CALCIUM SPEC-SCNC: 9.6 MG/DL (ref 8.6–10.5)
CHLORIDE SERPL-SCNC: 102 MMOL/L (ref 98–107)
CLARITY UR: CLEAR
CO2 SERPL-SCNC: 29.4 MMOL/L (ref 22–29)
COLOR UR: YELLOW
CREAT SERPL-MCNC: 0.89 MG/DL (ref 0.57–1)
DEPRECATED RDW RBC AUTO: 42 FL (ref 37–54)
EGFRCR SERPLBLD CKD-EPI 2021: 78.6 ML/MIN/1.73
EOSINOPHIL # BLD AUTO: 0.08 10*3/MM3 (ref 0–0.4)
EOSINOPHIL NFR BLD AUTO: 0.9 % (ref 0.3–6.2)
ERYTHROCYTE [DISTWIDTH] IN BLOOD BY AUTOMATED COUNT: 12.2 % (ref 12.3–15.4)
GLOBULIN UR ELPH-MCNC: 2.8 GM/DL
GLUCOSE SERPL-MCNC: 103 MG/DL (ref 65–99)
GLUCOSE UR STRIP-MCNC: NEGATIVE MG/DL
HCT VFR BLD AUTO: 40 % (ref 34–46.6)
HGB BLD-MCNC: 13.3 G/DL (ref 12–15.9)
HGB UR QL STRIP.AUTO: NEGATIVE
HYALINE CASTS UR QL AUTO: ABNORMAL /LPF
IMM GRANULOCYTES # BLD AUTO: 0.07 10*3/MM3 (ref 0–0.05)
IMM GRANULOCYTES NFR BLD AUTO: 0.8 % (ref 0–0.5)
KETONES UR QL STRIP: NEGATIVE
LEUKOCYTE ESTERASE UR QL STRIP.AUTO: ABNORMAL
LYMPHOCYTES # BLD AUTO: 2.23 10*3/MM3 (ref 0.7–3.1)
LYMPHOCYTES NFR BLD AUTO: 25.6 % (ref 19.6–45.3)
MCH RBC QN AUTO: 30.6 PG (ref 26.6–33)
MCHC RBC AUTO-ENTMCNC: 33.3 G/DL (ref 31.5–35.7)
MCV RBC AUTO: 92.2 FL (ref 79–97)
MONOCYTES # BLD AUTO: 0.52 10*3/MM3 (ref 0.1–0.9)
MONOCYTES NFR BLD AUTO: 6 % (ref 5–12)
NEUTROPHILS NFR BLD AUTO: 5.77 10*3/MM3 (ref 1.7–7)
NEUTROPHILS NFR BLD AUTO: 66.2 % (ref 42.7–76)
NITRITE UR QL STRIP: NEGATIVE
PH UR STRIP.AUTO: 6 [PH] (ref 5–8)
PLATELET # BLD AUTO: 263 10*3/MM3 (ref 140–450)
PMV BLD AUTO: 10.3 FL (ref 6–12)
POTASSIUM SERPL-SCNC: 3.8 MMOL/L (ref 3.5–5.2)
PROT SERPL-MCNC: 7.3 G/DL (ref 6–8.5)
PROT UR QL STRIP: ABNORMAL
RBC # BLD AUTO: 4.34 10*6/MM3 (ref 3.77–5.28)
RBC # UR STRIP: ABNORMAL /HPF
REF LAB TEST METHOD: ABNORMAL
SODIUM SERPL-SCNC: 144 MMOL/L (ref 136–145)
SP GR UR STRIP: 1.02 (ref 1–1.03)
SQUAMOUS #/AREA URNS HPF: ABNORMAL /HPF
UROBILINOGEN UR QL STRIP: ABNORMAL
WBC # UR STRIP: ABNORMAL /HPF
WBC NRBC COR # BLD AUTO: 8.71 10*3/MM3 (ref 3.4–10.8)

## 2024-11-21 PROCEDURE — 81001 URINALYSIS AUTO W/SCOPE: CPT | Performed by: PHYSICIAN ASSISTANT

## 2024-11-21 PROCEDURE — 80053 COMPREHEN METABOLIC PANEL: CPT | Performed by: PHYSICIAN ASSISTANT

## 2024-11-21 PROCEDURE — 85025 COMPLETE CBC W/AUTO DIFF WBC: CPT | Performed by: PHYSICIAN ASSISTANT

## 2024-11-21 PROCEDURE — 25510000001 IOPAMIDOL 61 % SOLUTION: Performed by: EMERGENCY MEDICINE

## 2024-11-21 PROCEDURE — 25810000003 SODIUM CHLORIDE 0.9 % SOLUTION: Performed by: PHYSICIAN ASSISTANT

## 2024-11-21 PROCEDURE — 25010000002 ONDANSETRON PER 1 MG: Performed by: PHYSICIAN ASSISTANT

## 2024-11-21 PROCEDURE — 96374 THER/PROPH/DIAG INJ IV PUSH: CPT

## 2024-11-21 PROCEDURE — 96375 TX/PRO/DX INJ NEW DRUG ADDON: CPT

## 2024-11-21 PROCEDURE — 99285 EMERGENCY DEPT VISIT HI MDM: CPT

## 2024-11-21 PROCEDURE — 87086 URINE CULTURE/COLONY COUNT: CPT | Performed by: PHYSICIAN ASSISTANT

## 2024-11-21 PROCEDURE — 96361 HYDRATE IV INFUSION ADD-ON: CPT

## 2024-11-21 PROCEDURE — 74177 CT ABD & PELVIS W/CONTRAST: CPT

## 2024-11-21 PROCEDURE — 25010000002 MORPHINE PER 10 MG: Performed by: EMERGENCY MEDICINE

## 2024-11-21 RX ORDER — ONDANSETRON 2 MG/ML
4 INJECTION INTRAMUSCULAR; INTRAVENOUS ONCE
Status: COMPLETED | OUTPATIENT
Start: 2024-11-21 | End: 2024-11-21

## 2024-11-21 RX ORDER — PHENAZOPYRIDINE HYDROCHLORIDE 200 MG/1
200 TABLET, FILM COATED ORAL 3 TIMES DAILY PRN
Qty: 12 TABLET | Refills: 0 | Status: SHIPPED | OUTPATIENT
Start: 2024-11-21

## 2024-11-21 RX ORDER — SODIUM CHLORIDE 0.9 % (FLUSH) 0.9 %
10 SYRINGE (ML) INJECTION AS NEEDED
Status: DISCONTINUED | OUTPATIENT
Start: 2024-11-21 | End: 2024-11-22 | Stop reason: HOSPADM

## 2024-11-21 RX ORDER — PHENAZOPYRIDINE HYDROCHLORIDE 100 MG/1
200 TABLET, FILM COATED ORAL ONCE
Status: COMPLETED | OUTPATIENT
Start: 2024-11-21 | End: 2024-11-21

## 2024-11-21 RX ORDER — IOPAMIDOL 612 MG/ML
100 INJECTION, SOLUTION INTRAVASCULAR
Status: COMPLETED | OUTPATIENT
Start: 2024-11-21 | End: 2024-11-21

## 2024-11-21 RX ADMIN — ONDANSETRON 4 MG: 2 INJECTION INTRAMUSCULAR; INTRAVENOUS at 20:05

## 2024-11-21 RX ADMIN — IOPAMIDOL 85 ML: 612 INJECTION, SOLUTION INTRAVENOUS at 20:15

## 2024-11-21 RX ADMIN — SODIUM CHLORIDE 1000 ML: 9 INJECTION, SOLUTION INTRAVENOUS at 20:05

## 2024-11-21 RX ADMIN — PHENAZOPYRIDINE HYDROCHLORIDE 200 MG: 100 TABLET ORAL at 21:52

## 2024-11-21 RX ADMIN — MORPHINE SULFATE 4 MG: 4 INJECTION, SOLUTION INTRAMUSCULAR; INTRAVENOUS at 20:04

## 2024-11-22 NOTE — FSED PROVIDER NOTE
Subjective  History of Present Illness:    Patient is a 49-year-old female presenting to the emergency department complaints of flank pain.  States symptoms began yesterday with urinary urgency and dysuria.  She reports worsening pain throughout the day and to her abdomen and right flank.  She reports associated nausea but denies vomiting.  She denies fever.  Does report a history of kidney stones and states she has had similar pain in the past.      Nurses Notes reviewed and agree, including vitals, allergies, social history and prior medical history.     REVIEW OF SYSTEMS: All systems reviewed and not pertinent unless noted.  Review of Systems   Gastrointestinal:  Positive for abdominal pain.   All other systems reviewed and are negative.      Past Medical History:   Diagnosis Date    Allergic rhinitis     frequent ear aches and sinus infections    Anxiety     w/ panic disorder, on Effexor + Lorazepam    Arthritis     mostly toes, from ballet dancing    Chronic nausea     prn Zofran    Dyspepsia     Dyspnea on exertion     Fatigue     Heartburn     no meds    Hyperlipidemia     Hyperparathyroidism     s/p partial parathyroidectomy 2020    Insomnia     on Clonidine    Joint pain     prn NSAIDS, no steroids    Kidney stones     s/p lithotripsy 2019    Migraines     Morbid obesity     CASTILLO (nonalcoholic steatohepatitis)     dx on CT, denies prior liver bx    Postoperative nausea     Prediabetes     A1C 5.7    TBI (traumatic brain injury)     age 12       Allergies:    Baclofen, Contrast dye (echo or unknown ct/mr), Cyclobenzaprine, Methocarbamol, Motrin [ibuprofen], and Oxycodone      Past Surgical History:   Procedure Laterality Date    APPENDECTOMY OPEN  1995    COLONOSCOPY  2021    hx colitis    ENDOSCOPY  2021    @[facility]    FOOT SURGERY Right     KIDNEY STONE SURGERY  2019    LAPAROSCOPIC CHOLECYSTECTOMY  2013    (+) stones    LAPAROSCOPIC OVARIAN CYSTECTOMY  2018    LAPAROSCOPIC TOTAL HYSTERECTOMY  2020     "@[facility]    PARATHYROIDECTOMY  2020    3 of 4 glands    PILONIDAL CYSTECTOMY  2013    x6 (2013, 2014, 2015, 2017, 2018)    UMBILICAL HERNIA REPAIR  2013    w/ lap ken @[facility]     SHUNT INSERTION Right 2022         Social History     Socioeconomic History    Marital status:    Tobacco Use    Smoking status: Never    Smokeless tobacco: Never   Substance and Sexual Activity    Alcohol use: Never    Drug use: Never    Sexual activity: Defer         Family History   Problem Relation Age of Onset    Breast cancer Mother 49    Obesity Father     Hypertension Father     Heart attack Father 59    Heart disease Father 59    Sleep apnea Father 65    Prostate cancer Father 68    No Known Problems Sister     No Known Problems Brother     Stroke Maternal Grandmother 82    Skin cancer Maternal Grandmother     Kidney disease Maternal Grandmother     Mental illness Maternal Grandmother     Skin cancer Maternal Grandfather     Alcohol abuse Maternal Grandfather     Heart attack Paternal Grandmother 80    Heart disease Paternal Grandmother 75    Alcohol abuse Paternal Grandfather        Objective  Physical Exam:  /80   Pulse 62   Temp 98.5 °F (36.9 °C)   Resp 18   Ht 170 cm (66.93\")   Wt 101 kg (222 lb 10.6 oz)   SpO2 98%   BMI 34.95 kg/m²      Physical Exam  Vitals and nursing note reviewed.   Constitutional:       Appearance: Normal appearance. She is normal weight.   HENT:      Head: Normocephalic and atraumatic.   Eyes:      Extraocular Movements: Extraocular movements intact.      Pupils: Pupils are equal, round, and reactive to light.   Cardiovascular:      Rate and Rhythm: Normal rate.   Pulmonary:      Effort: Pulmonary effort is normal.   Abdominal:      Palpations: Abdomen is soft.      Tenderness: There is abdominal tenderness. There is no right CVA tenderness.       Musculoskeletal:         General: Normal range of motion.      Cervical back: Normal range of motion and neck supple.   Skin:    "  General: Skin is warm and dry.   Neurological:      General: No focal deficit present.      Mental Status: She is alert and oriented to person, place, and time. Mental status is at baseline.   Psychiatric:         Mood and Affect: Mood normal.         Behavior: Behavior normal.         Thought Content: Thought content normal.         Judgment: Judgment normal.         Procedures    ED Course:     Patient evaluated for right-sided abdominal pain and flank pain.  UA significant for trace leukocytes.  15-20 white blood cells.  Labs otherwise are nonactionable with a normal white blood cell count, renal function.  CT imaging of the abdomen and pelvis shows bladder wall thickening consistent with cystitis.  Other chronic findings hepatic steatosis, as well as lipomatous changes of the pancreas were identified.  This was relayed to the patient, but unlikely to be the cause of her symptoms today.  No identifiable kidney stone was identified.  She was advised to continue Macrobid that was prescribed yesterday.  She was also given Pyridium for symptom management.  Urine will be sent for culture.  I advised the patient to return to the emergency department with any worsening symptoms or onset of fever.  She understands and agrees with this plan of care.  She was given a referral to follow-up on an outpatient basis regarding other findings.  She is well-appearing with stable vitals at time of discharge    Lab Results (last 24 hours)       Procedure Component Value Units Date/Time    CBC & Differential [[id number]]  (Abnormal) Collected: 11/21/24 1954    Specimen: Blood Updated: 11/21/24 2005    Narrative:      The following orders were created for panel order CBC & Differential.  Procedure                               Abnormality         Status                     ---------                               -----------         ------                     CBC Auto Differential[[id number]]        Abnormal            Final result                  Please view results for these tests on the individual orders.    Comprehensive Metabolic Panel [[id number]]  (Abnormal) Collected: 11/21/24 1954    Specimen: Blood Updated: 11/21/24 2020     Glucose 103 mg/dL      BUN 15 mg/dL      Creatinine 0.89 mg/dL      Sodium 144 mmol/L      Potassium 3.8 mmol/L      Chloride 102 mmol/L      CO2 29.4 mmol/L      Calcium 9.6 mg/dL      Total Protein 7.3 g/dL      Albumin 4.5 g/dL      ALT (SGPT) 33 U/L      AST (SGOT) 21 U/L      Alkaline Phosphatase 84 U/L      Total Bilirubin 0.2 mg/dL      Globulin 2.8 gm/dL      A/G Ratio 1.6 g/dL      BUN/Creatinine Ratio 16.9     Anion Gap 12.6 mmol/L      eGFR 78.6 mL/min/1.73     Narrative:      GFR Normal >60  Chronic Kidney Disease <60  Kidney Failure <15      Urinalysis With Microscopic If Indicated (No Culture) - Urine, Clean Catch [[id number]]  (Abnormal) Collected: 11/21/24 1954    Specimen: Urine, Clean Catch Updated: 11/21/24 2016     Color, UA Yellow     Appearance, UA Clear     pH, UA 6.0     Specific Gravity, UA 1.025     Glucose, UA Negative     Ketones, UA Negative     Bilirubin, UA Negative     Blood, UA Negative     Protein, UA Trace     Leuk Esterase, UA Trace     Nitrite, UA Negative     Urobilinogen, UA 0.2 E.U./dL    CBC Auto Differential [[id number]]  (Abnormal) Collected: 11/21/24 1954    Specimen: Blood Updated: 11/21/24 2005     WBC 8.71 10*3/mm3      RBC 4.34 10*6/mm3      Hemoglobin 13.3 g/dL      Hematocrit 40.0 %      MCV 92.2 fL      MCH 30.6 pg      MCHC 33.3 g/dL      RDW 12.2 %      RDW-SD 42.0 fl      MPV 10.3 fL      Platelets 263 10*3/mm3      Neutrophil % 66.2 %      Lymphocyte % 25.6 %      Monocyte % 6.0 %      Eosinophil % 0.9 %      Basophil % 0.5 %      Immature Grans % 0.8 %      Neutrophils, Absolute 5.77 10*3/mm3      Lymphocytes, Absolute 2.23 10*3/mm3      Monocytes, Absolute 0.52 10*3/mm3      Eosinophils, Absolute 0.08 10*3/mm3      Basophils, Absolute 0.04 10*3/mm3      Immature  Grans, Absolute 0.07 10*3/mm3     Urinalysis, Microscopic Only - Urine, Clean Catch [[id number]]  (Abnormal) Collected: 11/21/24 1954    Specimen: Urine, Clean Catch Updated: 11/21/24 2033     RBC, UA 0-2 /HPF      WBC, UA 11-20 /HPF      Bacteria, UA Trace /HPF      Squamous Epithelial Cells, UA 3-6 /HPF      Hyaline Casts, UA 3-6 /LPF      Methodology Manual Light Microscopy    Urine Culture - Urine, Urine, Clean Catch [[id number]] Collected: 11/21/24 1954    Specimen: Urine, Clean Catch Updated: 11/21/24 2212             CT Abdomen Pelvis With Contrast    Result Date: 11/21/2024  CT ABDOMEN PELVIS W CONTRAST Date of Exam: 11/21/2024 8:05 PM EST Indication: right flank pain. Comparison: CT abdomen pelvis 10/13/2020 Technique: Axial CT images were obtained of the abdomen and pelvis following the uneventful intravenous administration of 85 mL Isovue 300. Reconstructed coronal and sagittal images were also obtained. Automated exposure control and iterative construction methods were used. Findings: Heart size normal. no pericardial effusion. Lower lungs grossly clear. Mild hepatosplenomegaly. Liver measures up to 18.3 cm CC dimension spleen measures 13.5 cm AP dimension. Diffuse hepatic steatosis likely moderate to severe. No suspicious liver lesion. Portal vasculature patent. Cholecystectomy. Stable prominence of the  extrahepatic biliary tree likely related to postcholecystectomy ectasia given stability. No active pancreatitis or drainable collection. Mild lipomatous infiltration and atrophy of the pancreas. No suspicious adrenal nodule. Symmetric renal size, contour and enhancement. There is a 6 mm nonobstructing right midpole calculus. No hydronephrosis. Circumferential urinary bladder wall thickening nonspecific in the setting of underdistention. No suspicious filling defects. No suspicious adnexal mass. Expected configuration stomach and duodenum. Moderate formed colonic stool. No dilated or inflamed loops of  bowel. No CT evidence of acute appendicitis.  shunt catheter loosely coiled in the pelvis. Trace pelvic fluid without organized collection. Normal caliber abdominal aorta. No suspicious adenopathy. No free air. No acute soft tissue abnormality. Degenerative elated changes in the lower lumbar spine. No acute displaced fracture or aggressive lesion.     Impression: Impression: 1. Nonobstructing 6 mm right renal calculus. No hydronephrosis. 2. Mild hepatosplenomegaly with probably moderate to severe hepatic steatosis. 3. Mild lipomatous infiltration and atrophy of the pancreas. Correlate for underlying metabolic syndrome. 4. Uncomplicated  shunt catheter is loosely coiled in the pelvis. Trace pelvic fluid without organized collection. 5. Nonspecific bladder wall thickening in the setting of underdistention. Correlate for clinical signs of cystitis. 6. Moderate formed stool, question constipation. Electronically Signed: Flaco Moses MD  11/21/2024 8:49 PM EST  Workstation ID: [id number]        MDM        DDX: includes but is not limited to: Kidney stone, appendicitis, constipation, urinary tract infection    Patient arrives POV abdomen tender in right lower quadrant.  No CVA tenderness noted with vitals interpreted by myself.     Pertinent features from physical exam: Abdomen tender in right lower quadrant.  No CVA tenderness noted.          Medications   sodium chloride 0.9 % flush 10 mL (has no administration in time range)   sodium chloride 0.9 % bolus 1,000 mL (0 mL Intravenous Stopped 11/21/24 2159)   ondansetron (ZOFRAN) injection 4 mg (4 mg Intravenous Given 11/21/24 2005)   morphine injection 4 mg (4 mg Intravenous Given 11/21/24 2004)   iopamidol (ISOVUE-300) 61 % injection 100 mL (85 mL Intravenous Given 11/21/24 2015)   phenazopyridine (PYRIDIUM) tablet 200 mg (200 mg Oral Given 11/21/24 2152)       Results/clinical rationale were discussed with patient      -----  ED Disposition       ED Disposition    Discharge    Condition   Stable    Comment   --             Final diagnoses:   Cystitis   Constipation, unspecified constipation type   Abnormal CT of liver      Your Follow-Up Providers       Keisha Dallas MD.    Specialty: Internal Medicine  Follow up details: recheck of symptoms  [address]  [facility] [id number] [phone]               Rob Soriano MD.    Specialty: Gastroenterology  Follow up details: recheck of symptoms  [address]  [address]  [facility] [id number] [phone]                       Contact information for after-discharge care    Follow-up information has not been specified.                    Your medication list        START taking these medications        Instructions Last Dose Given Next Dose Due   phenazopyridine 200 MG tablet  Commonly known as: PYRIDIUM      Take 1 tablet by mouth 3 (Three) Times a Day As Needed for Bladder Spasms.              CONTINUE taking these medications        Instructions Last Dose Given Next Dose Due   brompheniramine-pseudoephedrine-DM 30-2-10 MG/5ML syrup      Take 5 mL by mouth 4 (Four) Times a Day As Needed for Allergies.       cefdinir 300 MG capsule  Commonly known as: OMNICEF      Take 1 capsule by mouth 2 (Two) Times a Day.       cloNIDine 0.1 MG tablet  Commonly known as: CATAPRES           Erenumab-aooe 140 MG/ML auto-injector  Commonly known as: AIMOVIG      Inject 140 mg under the skin into the appropriate area as directed Every 30 (Thirty) Days.       fluticasone 50 MCG/ACT nasal spray  Commonly known as: FLONASE      2 sprays into the nostril(s) as directed by provider Daily.       HYDROcodone-acetaminophen 5-325 MG per tablet  Commonly known as: NORCO      Take 1 tablet by mouth Every 6 (Six) Hours As Needed for Moderate Pain .       LORazepam 1 MG tablet  Commonly known as: ATIVAN      lorazepam       naratriptan 2.5 MG tablet  Commonly known as: AMERGE      Take 2.5 mg by mouth.       ondansetron ODT 4 MG  disintegrating tablet  Commonly known as: ZOFRAN-ODT      Place 1 tablet on the tongue 4 (Four) Times a Day As Needed for Nausea or Vomiting.       venlafaxine  MG 24 hr capsule  Commonly known as: EFFEXOR-XR      Effexor XR                 Where to Get Your Medications        These medications were sent to [facility][id number] - [place], [place] - [address] AT [facility] [address] - [phone]  - [phone]   [address], [facility] [address]      Phone: [phone]   phenazopyridine 200 MG tablet

## 2024-11-23 LAB — BACTERIA SPEC AEROBE CULT: NO GROWTH

## 2025-01-24 ENCOUNTER — APPOINTMENT (OUTPATIENT)
Dept: GENERAL RADIOLOGY | Facility: HOSPITAL | Age: 50
End: 2025-01-24
Payer: COMMERCIAL

## 2025-01-24 ENCOUNTER — APPOINTMENT (OUTPATIENT)
Facility: HOSPITAL | Age: 50
End: 2025-01-24
Payer: COMMERCIAL

## 2025-01-24 ENCOUNTER — HOSPITAL ENCOUNTER (EMERGENCY)
Facility: HOSPITAL | Age: 50
Discharge: HOME | End: 2025-01-24
Attending: EMERGENCY MEDICINE
Payer: COMMERCIAL

## 2025-01-24 VITALS
DIASTOLIC BLOOD PRESSURE: 96 MMHG | TEMPERATURE: 97.7 F | BODY MASS INDEX: 42.28 KG/M2 | OXYGEN SATURATION: 98 % | SYSTOLIC BLOOD PRESSURE: 147 MMHG | WEIGHT: 263.1 LBS | HEART RATE: 108 BPM | HEIGHT: 66 IN | RESPIRATION RATE: 18 BRPM

## 2025-01-24 DIAGNOSIS — M25.561 ACUTE PAIN OF RIGHT KNEE: Primary | ICD-10-CM

## 2025-01-24 LAB
ALBUMIN SERPL-MCNC: 4.3 G/DL (ref 3.5–5.2)
ALBUMIN/GLOB SERPL: 1.5 G/DL
ALP SERPL-CCNC: 81 U/L (ref 39–117)
ALT SERPL W P-5'-P-CCNC: 44 U/L (ref 1–33)
ANION GAP SERPL CALCULATED.3IONS-SCNC: 15 MMOL/L (ref 5–15)
AST SERPL-CCNC: 23 U/L (ref 1–32)
BASOPHILS # BLD AUTO: 0.02 10*3/MM3 (ref 0–0.2)
BASOPHILS NFR BLD AUTO: 0.3 % (ref 0–1.5)
BILIRUB SERPL-MCNC: 0.2 MG/DL (ref 0–1.2)
BUN SERPL-MCNC: 18 MG/DL (ref 6–20)
BUN/CREAT SERPL: 22 (ref 7–25)
CALCIUM SPEC-SCNC: 9.5 MG/DL (ref 8.6–10.5)
CHLORIDE SERPL-SCNC: 102 MMOL/L (ref 98–107)
CO2 SERPL-SCNC: 25 MMOL/L (ref 22–29)
CREAT SERPL-MCNC: 0.82 MG/DL (ref 0.57–1)
D DIMER PPP FEU-MCNC: 0.29 MCGFEU/ML (ref 0–0.51)
DEPRECATED RDW RBC AUTO: 44.8 FL (ref 37–54)
EGFRCR SERPLBLD CKD-EPI 2021: 86.7 ML/MIN/1.73
EOSINOPHIL # BLD AUTO: 0.11 10*3/MM3 (ref 0–0.4)
EOSINOPHIL NFR BLD AUTO: 1.6 % (ref 0.3–6.2)
ERYTHROCYTE [DISTWIDTH] IN BLOOD BY AUTOMATED COUNT: 13 % (ref 12.3–15.4)
GLOBULIN UR ELPH-MCNC: 2.8 GM/DL
GLUCOSE SERPL-MCNC: 166 MG/DL (ref 65–99)
HCT VFR BLD AUTO: 42.1 % (ref 34–46.6)
HGB BLD-MCNC: 13.4 G/DL (ref 12–15.9)
IMM GRANULOCYTES # BLD AUTO: 0.05 10*3/MM3 (ref 0–0.05)
IMM GRANULOCYTES NFR BLD AUTO: 0.7 % (ref 0–0.5)
LYMPHOCYTES # BLD AUTO: 2.36 10*3/MM3 (ref 0.7–3.1)
LYMPHOCYTES NFR BLD AUTO: 35.1 % (ref 19.6–45.3)
MCH RBC QN AUTO: 29.8 PG (ref 26.6–33)
MCHC RBC AUTO-ENTMCNC: 31.8 G/DL (ref 31.5–35.7)
MCV RBC AUTO: 93.6 FL (ref 79–97)
MONOCYTES # BLD AUTO: 0.5 10*3/MM3 (ref 0.1–0.9)
MONOCYTES NFR BLD AUTO: 7.4 % (ref 5–12)
NEUTROPHILS NFR BLD AUTO: 3.68 10*3/MM3 (ref 1.7–7)
NEUTROPHILS NFR BLD AUTO: 54.9 % (ref 42.7–76)
PLATELET # BLD AUTO: 239 10*3/MM3 (ref 140–450)
PMV BLD AUTO: 9.5 FL (ref 6–12)
POTASSIUM SERPL-SCNC: 4.1 MMOL/L (ref 3.5–5.2)
PROT SERPL-MCNC: 7.1 G/DL (ref 6–8.5)
RBC # BLD AUTO: 4.5 10*6/MM3 (ref 3.77–5.28)
SODIUM SERPL-SCNC: 142 MMOL/L (ref 136–145)
WBC NRBC COR # BLD AUTO: 6.72 10*3/MM3 (ref 3.4–10.8)

## 2025-01-24 PROCEDURE — 85025 COMPLETE CBC W/AUTO DIFF WBC: CPT | Performed by: EMERGENCY MEDICINE

## 2025-01-24 PROCEDURE — 73560 X-RAY EXAM OF KNEE 1 OR 2: CPT

## 2025-01-24 PROCEDURE — 85379 FIBRIN DEGRADATION QUANT: CPT | Performed by: EMERGENCY MEDICINE

## 2025-01-24 PROCEDURE — 36415 COLL VENOUS BLD VENIPUNCTURE: CPT

## 2025-01-24 PROCEDURE — 73700 CT LOWER EXTREMITY W/O DYE: CPT

## 2025-01-24 PROCEDURE — 99284 EMERGENCY DEPT VISIT MOD MDM: CPT

## 2025-01-24 PROCEDURE — 80053 COMPREHEN METABOLIC PANEL: CPT | Performed by: EMERGENCY MEDICINE

## 2025-01-24 RX ORDER — HYDROCODONE BITARTRATE AND ACETAMINOPHEN 5; 325 MG/1; MG/1
1 TABLET ORAL ONCE
Status: COMPLETED | OUTPATIENT
Start: 2025-01-24 | End: 2025-01-24

## 2025-01-24 RX ORDER — ROPINIROLE 2 MG/1
3 TABLET, FILM COATED ORAL NIGHTLY
COMMUNITY
Start: 2024-12-12

## 2025-01-24 RX ORDER — ONDANSETRON 4 MG/1
4 TABLET, ORALLY DISINTEGRATING ORAL EVERY 8 HOURS PRN
COMMUNITY
Start: 2025-01-13

## 2025-01-24 RX ORDER — HYDROCODONE BITARTRATE AND ACETAMINOPHEN 5; 325 MG/1; MG/1
1 TABLET ORAL DAILY PRN
COMMUNITY
Start: 2025-01-13

## 2025-01-24 RX ORDER — VENLAFAXINE HYDROCHLORIDE 37.5 MG/1
37.5 CAPSULE, EXTENDED RELEASE ORAL DAILY
COMMUNITY
Start: 2024-12-16

## 2025-01-24 RX ORDER — ALBUTEROL SULFATE 90 UG/1
2 INHALANT RESPIRATORY (INHALATION) EVERY 4 HOURS PRN
COMMUNITY
Start: 2025-01-03 | End: 2026-01-03

## 2025-01-24 RX ADMIN — HYDROCODONE BITARTRATE AND ACETAMINOPHEN 1 TABLET: 5; 325 TABLET ORAL at 21:30

## 2025-01-25 NOTE — DISCHARGE INSTRUCTIONS
Please follow-up with Dr. De La Cruz.  Call for appointment.  Use crutches to ambulate.  Do not bear weight on your right lower extremity.  Take your hydrocodone as prescribed.  Consider using ice over the knee to provide pain relief and decrease swelling.

## 2025-01-25 NOTE — FSED PROVIDER NOTE
Subjective  History of Present Illness:    Patient arrives to the emergency department pain in her right knee.  Pain has been present for several days.  She states several weeks ago she may have rolled off the bed.  She reports she is an active sleeper and moves around a lot in her sleep.  Denies hitting her knee.  Triage note states it happened 6 days ago however the patient thinks this happened more remotely.  She now has difficulty bearing weight on the knee.  No other symptoms.    Nurses Notes reviewed and agree, including vitals, allergies, social history and prior medical history.     REVIEW OF SYSTEMS: All systems reviewed and not pertinent unless noted.    Past Medical History:   Diagnosis Date    Allergic rhinitis     frequent ear aches and sinus infections    Anxiety     w/ panic disorder, on Effexor + Lorazepam    Arthritis     mostly toes, from ballet dancing    Chronic nausea     prn Zofran    Dyspepsia     Dyspnea on exertion     Fatigue     Heartburn     no meds    Hyperlipidemia     Hyperparathyroidism     s/p partial parathyroidectomy 2020    Insomnia     on Clonidine    Joint pain     prn NSAIDS, no steroids    Kidney stones     s/p lithotripsy 2019    Migraines     Morbid obesity     CASTILLO (nonalcoholic steatohepatitis)     dx on CT, denies prior liver bx    Postoperative nausea     Prediabetes     A1C 5.7    TBI (traumatic brain injury)     age 12       Allergies:    Baclofen, Contrast dye (echo or unknown ct/mr), Cyclobenzaprine, Methocarbamol, Motrin [ibuprofen], and Oxycodone      Past Surgical History:   Procedure Laterality Date    APPENDECTOMY OPEN  1995    COLONOSCOPY  2021    hx colitis    ENDOSCOPY  2021    @    FOOT SURGERY Right     KIDNEY STONE SURGERY  2019    LAPAROSCOPIC CHOLECYSTECTOMY  2013    (+) stones    LAPAROSCOPIC OVARIAN CYSTECTOMY  2018    LAPAROSCOPIC TOTAL HYSTERECTOMY  2020    @[facility]    PARATHYROIDECTOMY  2020    3 of 4 glands    PILONIDAL CYSTECTOMY  2013  "   x6 (2013, 2014, 2015, 2017, 2018)    UMBILICAL HERNIA REPAIR  2013    w/ lap ken @[facility]     SHUNT INSERTION Right 2022         Social History     Socioeconomic History    Marital status:    Tobacco Use    Smoking status: Never    Smokeless tobacco: Never   Substance and Sexual Activity    Alcohol use: Never    Drug use: Never    Sexual activity: Defer         Family History   Problem Relation Age of Onset    Breast cancer Mother 49    Obesity Father     Hypertension Father     Heart attack Father 59    Heart disease Father 59    Sleep apnea Father 65    Prostate cancer Father 68    No Known Problems Sister     No Known Problems Brother     Stroke Maternal Grandmother 82    Skin cancer Maternal Grandmother     Kidney disease Maternal Grandmother     Mental illness Maternal Grandmother     Skin cancer Maternal Grandfather     Alcohol abuse Maternal Grandfather     Heart attack Paternal Grandmother 80    Heart disease Paternal Grandmother 75    Alcohol abuse Paternal Grandfather        Objective  Physical Exam:  /96 (BP Location: Left arm, Patient Position: Sitting)   Pulse 108   Temp 97.7 °F (36.5 °C) (Oral)   Resp 18   Ht 167.6 cm (66\")   Wt 119 kg (263 lb 1.6 oz)   SpO2 98%   BMI 42.47 kg/m²      Physical Exam    [Primary Survey    Airway: Patent and protected  Breathing: Symmetric bilaterally  Circulation: Mentating well, responsive        Constitutional: Nontoxic appearance.  Psychological: No abnormalities of mood affect.  Head: Atraumatic  Eyes: Conjunctiva are non-injected. no scleral icterus.  ENT: No obvious congestion or obstruction noted  Neck: No obvious deformity.  ROM appears preserved  Chest: No deformity noted.  No paradoxical breathing noted  Respiratory: Respiratory effort was normal - no use of accessory respiratory muscles noted.  There is no stridor.  Cardiovascular: Perfusion appears preserved - mentating well RRR no murmurs  Gastrointestinal: Abdomen " nondistended.  Genitourinary: Not examined  Lymphatic: Not examined  Back: Not examined  Musculoskeletal: Musculoskeletal system is grossly intact.  There is no obvious deformity.  No significant tenderness at the distal femur or patella, mild tenderness at the tibial plateau.  Pain is worse with flexion or extension of the right knee.  No obvious edema.  Mild laxity with posterior pressure at the tibia calf measures 43.5 cm on the right and 44.5 cm on the left.  Lower extremities are warm.  She is neurovascular intact distally  Neurological: Face: No Asymmetry.  Gross motor movement is intact in all 4 extremities.   Patient exhibits normal speech.  Skin: No Pallor no obvious bruising.  No obvious rash.]      ED Course:    Lab Results (last 24 hours)       Procedure Component Value Units Date/Time    CBC & Differential [[id number]]  (Abnormal) Collected: 01/24/25 2134    Specimen: Blood from Arm, Right Updated: 01/24/25 2140    Narrative:      The following orders were created for panel order CBC & Differential.  Procedure                               Abnormality         Status                     ---------                               -----------         ------                     CBC Auto Differential[[id number]]        Abnormal            Final result                 Please view results for these tests on the individual orders.    Comprehensive Metabolic Panel [[id number]]  (Abnormal) Collected: 01/24/25 2134    Specimen: Blood from Arm, Right Updated: 01/24/25 2156     Glucose 166 mg/dL      BUN 18 mg/dL      Creatinine 0.82 mg/dL      Sodium 142 mmol/L      Potassium 4.1 mmol/L      Chloride 102 mmol/L      CO2 25.0 mmol/L      Calcium 9.5 mg/dL      Total Protein 7.1 g/dL      Albumin 4.3 g/dL      ALT (SGPT) 44 U/L      AST (SGOT) 23 U/L      Alkaline Phosphatase 81 U/L      Total Bilirubin 0.2 mg/dL      Globulin 2.8 gm/dL      A/G Ratio 1.5 g/dL      BUN/Creatinine Ratio 22.0     Anion Gap 15.0 mmol/L       "eGFR 86.7 mL/min/1.73     Narrative:      GFR Categories in Chronic Kidney Disease (CKD)      GFR Category          GFR (mL/min/1.73)    Interpretation  G1                     90 or greater         Normal or high (1)  G2                      60-89                Mild decrease (1)  G3a                   45-59                Mild to moderate decrease  G3b                   30-44                Moderate to severe decrease  G4                    15-29                Severe decrease  G5                    14 or less           Kidney failure          (1)In the absence of evidence of kidney disease, neither GFR category G1 or G2 fulfill the criteria for CKD.    eGFR calculation 2021 CKD-EPI creatinine equation, which does not include race as a factor    D-dimer, Quantitative [[id number]]  (Normal) Collected: 01/24/25 2134    Specimen: Blood from Arm, Right Updated: 01/24/25 2153     D-Dimer, Quantitative 0.29 MCGFEU/mL     Narrative:      According to the assay 's published package insert, a normal (<0.50 MCGFEU/mL) D-dimer result in conjunction with a non-high clinical probability assessment, excludes deep vein thrombosis (DVT) and pulmonary embolism (PE) with high sensitivity.    D-dimer values increase with age and this can make VTE exclusion of an older population difficult. To address this, the [employer], based on best available evidence and recent guidelines, recommends that clinicians use age-adjusted D-dimer thresholds in patients greater than 50 years of age with: a) a low probability of PE who do not meet all Pulmonary Embolism Rule Out Criteria, or b) in those with intermediate probability of PE.   The formula for an age-adjusted D-dimer cut-off is \"age/100\".  For example, a 60 year old patient would have an age-adjusted cut-off of 0.60 MCGFEU/mL and an 80 year old 0.80 MCGFEU/mL.    CBC Auto Differential [[id number]]  (Abnormal) Collected: 01/24/25 2134    Specimen: Blood from " Arm, Right Updated: 01/24/25 2140     WBC 6.72 10*3/mm3      RBC 4.50 10*6/mm3      Hemoglobin 13.4 g/dL      Hematocrit 42.1 %      MCV 93.6 fL      MCH 29.8 pg      MCHC 31.8 g/dL      RDW 13.0 %      RDW-SD 44.8 fl      MPV 9.5 fL      Platelets 239 10*3/mm3      Neutrophil % 54.9 %      Lymphocyte % 35.1 %      Monocyte % 7.4 %      Eosinophil % 1.6 %      Basophil % 0.3 %      Immature Grans % 0.7 %      Neutrophils, Absolute 3.68 10*3/mm3      Lymphocytes, Absolute 2.36 10*3/mm3      Monocytes, Absolute 0.50 10*3/mm3      Eosinophils, Absolute 0.11 10*3/mm3      Basophils, Absolute 0.02 10*3/mm3      Immature Grans, Absolute 0.05 10*3/mm3              CT Lower Extremity Right Without Contrast    Result Date: 1/24/2025  CT LOWER EXTREMITY RIGHT WO CONTRAST Date of Exam: 1/24/2025 9:31 PM EST Indication: concern for occult fracture at tibial plateai. Comparison: None available. Technique: Axial CT images were obtained of the right lower extremity without contrast administration.  Reconstructed coronal and sagittal images were also obtained. Automated exposure control and iterative construction methods were used. Findings: No evidence of fracture of the distal femur, proximal tibia, proximal fibula, and patella. Fluid present in the suprapatellar bursa. No lipohemarthrosis.     Impression: Impression: 1. No evidence of fracture 2. Joint effusion which could indicate an internal derangement Electronically Signed: Basilio Bran  1/24/2025 9:54 PM EST  Workstation ID: [id number]    XR Knee 1 or 2 View Right    Result Date: 1/24/2025  XR KNEE 1 OR 2 VW RIGHT Date of Exam: 1/24/2025 8:22 PM EST Indication: pain Comparison: None available. Findings: No bony abnormality. No joint space loss or arthritic change. Fluid present in the suprapatellar bursa     Impression: Impression: 1. No bony abnormality 2. Joint effusion Electronically Signed: Basilio Bran  1/24/2025 8:39 PM EST  Workstation ID: [id number]      No orders to  display       Procedures    MDM    Initial impression of presenting illness and DDX (differential diagnosis includes but not limited to those included): 49-year-old female with undifferentiated severe right knee pain.  Appears to have significant tenderness at the tibial plateau with full weightbearing no obvious edema or erythema.  Low suspicion for a septic arthritis.  She may have a posterior fusion ligament injury based on exam but given her pain I cannot get an excellent exam on her knee.  I am concerned she cannot bear weight due to her pain-she could have a occult fracture.  Initial x-ray was negative from triage.  Will obtain a CT to rule out occult tibial plateau fracture.  No suspicion for DVT or arterial occlusion       initial plan and/or treatments: Treating pain with hydrocodone.  Obtaining laboratory studies and diagnostic imaging    diagnostic plan was ordered and interpreted by JASON Santos MD.  ED Course as of 01/24/25 2212 Fri Jan 24, 2025 2207 No evidence of fracture.  She does have an effusion on CT suggestive of internal derangement.  I think she benefit from a knee immobilizer, crutches.  She will be nonweightbearing and will follow-up with orthopedics.  Will prescribe oral pain medication as necessary.  Will check her Reuben.  Will discharge. [GREGORY]   2211 Patient is prescribed hydrocodone and lorazepam.  No additional pain medication will be provided.  She will discuss any further pain medication with her pain clinic [GREGORY]      ED Course User Index  [GREGORY] Allen Santos MD        Medications   HYDROcodone-acetaminophen (NORCO) 5-325 MG per tablet 1 tablet (1 tablet Oral Given 1/24/25 2130)       HEART SCORE   No data recorded           -----  ED Disposition       ED Disposition   Discharge    Condition   Stable    Comment   --             Final diagnoses:   Acute pain of right knee      Your Follow-Up Providers       Schedule an appointment as soon as possible for a visit  with Ross  Patel Garcia MD.    Specialty: Orthopedic Surgery  [address] [address]  [phone]                       Contact information for after-discharge care    Follow-up information has not been specified.                    Your medication list        CONTINUE taking these medications        Instructions Last Dose Given Next Dose Due   albuterol sulfate  (90 Base) MCG/ACT inhaler  Commonly known as: PROVENTIL HFA;VENTOLIN HFA;PROAIR HFA      Inhale 2 puffs Every 4 (Four) Hours As Needed.       cloNIDine 0.1 MG tablet  Commonly known as: CATAPRES      Take 1-3 tablets by mouth At Night As Needed.       Erenumab-aooe 140 MG/ML auto-injector  Commonly known as: AIMOVIG      Inject 140 mg under the skin into the appropriate area as directed Every 30 (Thirty) Days.       fluticasone 50 MCG/ACT nasal spray  Commonly known as: FLONASE      2 sprays into the nostril(s) as directed by provider Daily.       HYDROcodone-acetaminophen 5-325 MG per tablet  Commonly known as: NORCO      Take 1 tablet by mouth Daily As Needed for Severe Pain.       LORazepam 1 MG tablet  Commonly known as: ATIVAN      Take 1 tablet by mouth Every 8 (Eight) Hours As Needed for Anxiety.       ondansetron ODT 4 MG disintegrating tablet  Commonly known as: ZOFRAN-ODT      Take 1 tablet by mouth Every 8 (Eight) Hours As Needed for Nausea or Vomiting.       rOPINIRole 2 MG tablet  Commonly known as: REQUIP      Take 1.5 tablets by mouth Every Night.       venlafaxine  MG 24 hr capsule  Commonly known as: EFFEXOR-XR      Take 1 capsule by mouth Daily.       venlafaxine XR 37.5 MG 24 hr capsule  Commonly known as: EFFEXOR-XR      Take 1 capsule by mouth Daily.